# Patient Record
Sex: FEMALE | Race: OTHER | HISPANIC OR LATINO
[De-identification: names, ages, dates, MRNs, and addresses within clinical notes are randomized per-mention and may not be internally consistent; named-entity substitution may affect disease eponyms.]

---

## 2017-11-01 ENCOUNTER — APPOINTMENT (OUTPATIENT)
Dept: OTOLARYNGOLOGY | Facility: CLINIC | Age: 32
End: 2017-11-01
Payer: COMMERCIAL

## 2017-11-01 PROCEDURE — 99244 OFF/OP CNSLTJ NEW/EST MOD 40: CPT

## 2017-11-13 ENCOUNTER — OUTPATIENT (OUTPATIENT)
Dept: OUTPATIENT SERVICES | Facility: HOSPITAL | Age: 32
LOS: 1 days | Discharge: HOME | End: 2017-11-13

## 2017-11-13 DIAGNOSIS — Z01.818 ENCOUNTER FOR OTHER PREPROCEDURAL EXAMINATION: ICD-10-CM

## 2017-12-05 ENCOUNTER — APPOINTMENT (OUTPATIENT)
Dept: OTOLARYNGOLOGY | Facility: HOSPITAL | Age: 32
End: 2017-12-05

## 2022-11-30 PROBLEM — Z00.00 ENCOUNTER FOR PREVENTIVE HEALTH EXAMINATION: Status: ACTIVE | Noted: 2022-11-30

## 2022-12-02 ENCOUNTER — APPOINTMENT (OUTPATIENT)
Dept: PLASTIC SURGERY | Facility: CLINIC | Age: 37
End: 2022-12-02

## 2022-12-02 VITALS — BODY MASS INDEX: 36.14 KG/M2 | HEIGHT: 63 IN | WEIGHT: 204 LBS

## 2022-12-02 DIAGNOSIS — Z78.9 OTHER SPECIFIED HEALTH STATUS: ICD-10-CM

## 2022-12-02 DIAGNOSIS — Z87.09 PERSONAL HISTORY OF OTHER DISEASES OF THE RESPIRATORY SYSTEM: ICD-10-CM

## 2022-12-02 DIAGNOSIS — Z86.39 PERSONAL HISTORY OF OTHER ENDOCRINE, NUTRITIONAL AND METABOLIC DISEASE: ICD-10-CM

## 2022-12-02 DIAGNOSIS — Z80.3 FAMILY HISTORY OF MALIGNANT NEOPLASM OF BREAST: ICD-10-CM

## 2022-12-02 PROCEDURE — 99203 OFFICE O/P NEW LOW 30 MIN: CPT

## 2022-12-02 RX ORDER — FAMOTIDINE 20 MG/1
20 TABLET, FILM COATED ORAL
Qty: 30 | Refills: 0 | Status: ACTIVE | COMMUNITY
Start: 2022-06-22

## 2022-12-02 RX ORDER — MULTIVITAMIN
TABLET ORAL
Refills: 0 | Status: ACTIVE | COMMUNITY

## 2022-12-02 RX ORDER — NAPROXEN 500 MG/1
500 TABLET ORAL
Qty: 28 | Refills: 0 | Status: ACTIVE | COMMUNITY
Start: 2022-06-28

## 2022-12-02 RX ORDER — ALBUTEROL SULFATE 0.63 MG/3ML
0.63 SOLUTION RESPIRATORY (INHALATION)
Refills: 0 | Status: ACTIVE | COMMUNITY

## 2022-12-02 RX ORDER — TIZANIDINE 4 MG/1
4 TABLET ORAL
Qty: 14 | Refills: 0 | Status: ACTIVE | COMMUNITY
Start: 2022-06-28

## 2022-12-02 RX ORDER — FLUTICASONE PROPIONATE AND SALMETEROL 50; 250 UG/1; UG/1
250-50 POWDER RESPIRATORY (INHALATION)
Qty: 60 | Refills: 0 | Status: ACTIVE | COMMUNITY
Start: 2022-08-03

## 2023-04-11 ENCOUNTER — NON-APPOINTMENT (OUTPATIENT)
Age: 38
End: 2023-04-11

## 2023-04-14 ENCOUNTER — NON-APPOINTMENT (OUTPATIENT)
Age: 38
End: 2023-04-14

## 2023-04-23 ENCOUNTER — LABORATORY RESULT (OUTPATIENT)
Age: 38
End: 2023-04-23

## 2023-04-24 ENCOUNTER — APPOINTMENT (OUTPATIENT)
Dept: OBGYN | Facility: CLINIC | Age: 38
End: 2023-04-24
Payer: COMMERCIAL

## 2023-04-24 VITALS — BODY MASS INDEX: 36.32 KG/M2 | HEIGHT: 63 IN | TEMPERATURE: 96.7 F | WEIGHT: 205 LBS

## 2023-04-24 DIAGNOSIS — E04.9 NONTOXIC GOITER, UNSPECIFIED: ICD-10-CM

## 2023-04-24 LAB
BILIRUB UR QL STRIP: NORMAL
GLUCOSE UR-MCNC: NORMAL
HCG UR QL: 0.2 EU/DL
HGB UR QL STRIP.AUTO: NORMAL
KETONES UR-MCNC: NORMAL
LEUKOCYTE ESTERASE UR QL STRIP: NORMAL
NITRITE UR QL STRIP: NORMAL
PH UR STRIP: 5
PROT UR STRIP-MCNC: NORMAL
SP GR UR STRIP: 1.02

## 2023-04-24 PROCEDURE — 76830 TRANSVAGINAL US NON-OB: CPT

## 2023-04-24 PROCEDURE — 99385 PREV VISIT NEW AGE 18-39: CPT

## 2023-05-03 PROBLEM — E04.9 GOITER: Status: ACTIVE | Noted: 2023-05-03

## 2023-05-15 ENCOUNTER — APPOINTMENT (OUTPATIENT)
Dept: OBGYN | Facility: CLINIC | Age: 38
End: 2023-05-15
Payer: COMMERCIAL

## 2023-05-15 VITALS — BODY MASS INDEX: 36.68 KG/M2 | HEIGHT: 63 IN | WEIGHT: 207 LBS | TEMPERATURE: 97.6 F

## 2023-05-15 PROCEDURE — 99212 OFFICE O/P EST SF 10 MIN: CPT

## 2023-05-15 PROCEDURE — 76830 TRANSVAGINAL US NON-OB: CPT

## 2023-06-01 ENCOUNTER — LABORATORY RESULT (OUTPATIENT)
Age: 38
End: 2023-06-01

## 2023-06-01 ENCOUNTER — APPOINTMENT (OUTPATIENT)
Dept: OBGYN | Facility: CLINIC | Age: 38
End: 2023-06-01
Payer: COMMERCIAL

## 2023-06-01 VITALS — TEMPERATURE: 97.2 F | BODY MASS INDEX: 37.56 KG/M2 | HEIGHT: 63 IN | WEIGHT: 212 LBS

## 2023-06-01 PROCEDURE — 76813 OB US NUCHAL MEAS 1 GEST: CPT

## 2023-06-01 PROCEDURE — 0500F INITIAL PRENATAL CARE VISIT: CPT

## 2023-06-02 ENCOUNTER — APPOINTMENT (OUTPATIENT)
Dept: PLASTIC SURGERY | Facility: CLINIC | Age: 38
End: 2023-06-02

## 2023-06-09 ENCOUNTER — NON-APPOINTMENT (OUTPATIENT)
Age: 38
End: 2023-06-09

## 2023-06-17 DIAGNOSIS — Z01.419 ENCOUNTER FOR GYNECOLOGICAL EXAMINATION (GENERAL) (ROUTINE) W/OUT ABNORMAL FINDINGS: ICD-10-CM

## 2023-06-17 DIAGNOSIS — N91.1 SECONDARY AMENORRHEA: ICD-10-CM

## 2023-06-17 DIAGNOSIS — N62 HYPERTROPHY OF BREAST: ICD-10-CM

## 2023-06-21 ENCOUNTER — APPOINTMENT (OUTPATIENT)
Dept: OBGYN | Facility: CLINIC | Age: 38
End: 2023-06-21
Payer: COMMERCIAL

## 2023-06-21 VITALS — WEIGHT: 209 LBS | TEMPERATURE: 98.2 F | BODY MASS INDEX: 37.03 KG/M2 | HEIGHT: 63 IN

## 2023-06-21 PROCEDURE — 0502F SUBSEQUENT PRENATAL CARE: CPT

## 2023-06-22 LAB
BILIRUB UR QL STRIP: NORMAL
GLUCOSE UR-MCNC: NORMAL
HCG UR QL: 0.2 EU/DL
HGB UR QL STRIP.AUTO: NORMAL
KETONES UR-MCNC: NORMAL
LEUKOCYTE ESTERASE UR QL STRIP: NORMAL
NITRITE UR QL STRIP: NORMAL
PH UR STRIP: 6
PROT UR STRIP-MCNC: NORMAL
SP GR UR STRIP: 1.03

## 2023-07-11 ENCOUNTER — NON-APPOINTMENT (OUTPATIENT)
Age: 38
End: 2023-07-11

## 2023-07-12 ENCOUNTER — APPOINTMENT (OUTPATIENT)
Dept: OBGYN | Facility: CLINIC | Age: 38
End: 2023-07-12
Payer: COMMERCIAL

## 2023-07-12 VITALS — WEIGHT: 208 LBS | BODY MASS INDEX: 36.86 KG/M2 | HEIGHT: 63 IN

## 2023-07-12 LAB
BILIRUB UR QL STRIP: NEGATIVE
CLARITY UR: CLEAR
GLUCOSE UR-MCNC: NEGATIVE
HCG UR QL: 0.2 EU/DL
HGB UR QL STRIP.AUTO: NEGATIVE
KETONES UR-MCNC: NORMAL
LEUKOCYTE ESTERASE UR QL STRIP: NEGATIVE
NITRITE UR QL STRIP: NEGATIVE
PH UR STRIP: 5.5
PROT UR STRIP-MCNC: NEGATIVE
SP GR UR STRIP: 1.03

## 2023-07-12 PROCEDURE — 0502F SUBSEQUENT PRENATAL CARE: CPT

## 2023-07-19 ENCOUNTER — LABORATORY RESULT (OUTPATIENT)
Age: 38
End: 2023-07-19

## 2023-07-27 ENCOUNTER — NON-APPOINTMENT (OUTPATIENT)
Age: 38
End: 2023-07-27

## 2023-07-28 ENCOUNTER — NON-APPOINTMENT (OUTPATIENT)
Age: 38
End: 2023-07-28

## 2023-08-02 ENCOUNTER — APPOINTMENT (OUTPATIENT)
Dept: OBGYN | Facility: CLINIC | Age: 38
End: 2023-08-02
Payer: COMMERCIAL

## 2023-08-02 VITALS — WEIGHT: 214 LBS | HEIGHT: 62 IN | BODY MASS INDEX: 39.38 KG/M2

## 2023-08-02 PROCEDURE — 0502F SUBSEQUENT PRENATAL CARE: CPT

## 2023-08-02 PROCEDURE — 76805 OB US >/= 14 WKS SNGL FETUS: CPT

## 2023-08-09 ENCOUNTER — NON-APPOINTMENT (OUTPATIENT)
Age: 38
End: 2023-08-09

## 2023-08-14 ENCOUNTER — LABORATORY RESULT (OUTPATIENT)
Age: 38
End: 2023-08-14

## 2023-08-14 ENCOUNTER — NON-APPOINTMENT (OUTPATIENT)
Age: 38
End: 2023-08-14

## 2023-08-18 ENCOUNTER — NON-APPOINTMENT (OUTPATIENT)
Age: 38
End: 2023-08-18

## 2023-08-21 ENCOUNTER — NON-APPOINTMENT (OUTPATIENT)
Age: 38
End: 2023-08-21

## 2023-08-23 ENCOUNTER — TRANSCRIPTION ENCOUNTER (OUTPATIENT)
Age: 38
End: 2023-08-23

## 2023-08-23 ENCOUNTER — APPOINTMENT (OUTPATIENT)
Dept: OBGYN | Facility: CLINIC | Age: 38
End: 2023-08-23
Payer: COMMERCIAL

## 2023-08-23 VITALS — HEIGHT: 62 IN | BODY MASS INDEX: 39.38 KG/M2 | WEIGHT: 214 LBS | TEMPERATURE: 98.2 F

## 2023-08-23 LAB
BILIRUB UR QL STRIP: NORMAL
GLUCOSE UR-MCNC: NORMAL
HCG UR QL: 0.2 EU/DL
HGB UR QL STRIP.AUTO: NORMAL
KETONES UR-MCNC: NORMAL
LEUKOCYTE ESTERASE UR QL STRIP: NORMAL
NITRITE UR QL STRIP: NORMAL
PH UR STRIP: 6
PROT UR STRIP-MCNC: NORMAL
SP GR UR STRIP: 1.02

## 2023-08-23 PROCEDURE — 0502F SUBSEQUENT PRENATAL CARE: CPT

## 2023-09-11 ENCOUNTER — NON-APPOINTMENT (OUTPATIENT)
Age: 38
End: 2023-09-11

## 2023-09-14 ENCOUNTER — APPOINTMENT (OUTPATIENT)
Dept: OBGYN | Facility: CLINIC | Age: 38
End: 2023-09-14
Payer: COMMERCIAL

## 2023-09-14 VITALS — TEMPERATURE: 98.2 F | WEIGHT: 216 LBS | HEIGHT: 62 IN | BODY MASS INDEX: 39.75 KG/M2

## 2023-09-14 DIAGNOSIS — Z00.00 ENCOUNTER FOR GENERAL ADULT MEDICAL EXAMINATION W/OUT ABNORMAL FINDINGS: ICD-10-CM

## 2023-09-14 DIAGNOSIS — Z23 ENCOUNTER FOR IMMUNIZATION: ICD-10-CM

## 2023-09-14 LAB
BILIRUB UR QL STRIP: NORMAL
GLUCOSE UR-MCNC: NORMAL
HCG UR QL: 0.2 EU/DL
HGB UR QL STRIP.AUTO: NORMAL
KETONES UR-MCNC: 15
LEUKOCYTE ESTERASE UR QL STRIP: NORMAL
NITRITE UR QL STRIP: NORMAL
PH UR STRIP: 6
PROT UR STRIP-MCNC: NORMAL
SP GR UR STRIP: 1.02

## 2023-09-14 PROCEDURE — 90715 TDAP VACCINE 7 YRS/> IM: CPT

## 2023-09-14 PROCEDURE — 90471 IMMUNIZATION ADMIN: CPT

## 2023-09-14 PROCEDURE — 0502F SUBSEQUENT PRENATAL CARE: CPT

## 2023-09-20 ENCOUNTER — NON-APPOINTMENT (OUTPATIENT)
Age: 38
End: 2023-09-20

## 2023-10-03 ENCOUNTER — APPOINTMENT (OUTPATIENT)
Dept: OBGYN | Facility: CLINIC | Age: 38
End: 2023-10-03
Payer: COMMERCIAL

## 2023-10-03 VITALS — HEIGHT: 63 IN | TEMPERATURE: 97.3 F | WEIGHT: 217 LBS | BODY MASS INDEX: 38.45 KG/M2

## 2023-10-03 PROCEDURE — 0502F SUBSEQUENT PRENATAL CARE: CPT

## 2023-10-09 ENCOUNTER — NON-APPOINTMENT (OUTPATIENT)
Age: 38
End: 2023-10-09

## 2023-10-09 LAB
ALBUMIN SERPL ELPH-MCNC: 3.3 G/DL
ALP BLD-CCNC: 116 U/L
ALT SERPL-CCNC: 13 U/L
ANION GAP SERPL CALC-SCNC: 9 MMOL/L
AST SERPL-CCNC: 13 U/L
BASOPHILS # BLD AUTO: 0.02 K/UL
BASOPHILS NFR BLD AUTO: 0.2 %
BILIRUB SERPL-MCNC: <0.2 MG/DL
BUN SERPL-MCNC: 7 MG/DL
CALCIUM SERPL-MCNC: 9 MG/DL
CHLORIDE SERPL-SCNC: 101 MMOL/L
CO2 SERPL-SCNC: 24 MMOL/L
CREAT SERPL-MCNC: 0.8 MG/DL
CREAT SPEC-SCNC: 264 MG/DL
CREAT/PROT UR: 0.1 RATIO
EGFR: 97 ML/MIN/1.73M2
EOSINOPHIL # BLD AUTO: 0.04 K/UL
EOSINOPHIL NFR BLD AUTO: 0.4 %
FERRITIN SERPL-MCNC: 28 NG/ML
GLUCOSE SERPL-MCNC: 98 MG/DL
HBV SURFACE AG SER QL: NONREACTIVE
HCT VFR BLD CALC: 38.5 %
HCV AB SER QL: NONREACTIVE
HCV S/CO RATIO: 0.08 S/CO
HGB BLD-MCNC: 12.4 G/DL
HIV1+2 AB SPEC QL IA.RAPID: NONREACTIVE
IMM GRANULOCYTES NFR BLD AUTO: 0.4 %
LDH SERPL-CCNC: 141
LYMPHOCYTES # BLD AUTO: 1.73 K/UL
LYMPHOCYTES NFR BLD AUTO: 17.7 %
MAN DIFF?: NORMAL
MCHC RBC-ENTMCNC: 32 PG
MCHC RBC-ENTMCNC: 32.2 G/DL
MCV RBC AUTO: 99.5 FL
MONOCYTES # BLD AUTO: 0.67 K/UL
MONOCYTES NFR BLD AUTO: 6.9 %
NEUTROPHILS # BLD AUTO: 7.28 K/UL
NEUTROPHILS NFR BLD AUTO: 74.4 %
PLATELET # BLD AUTO: 211 K/UL
POTASSIUM SERPL-SCNC: 4.4 MMOL/L
PROT SERPL-MCNC: 6.3 G/DL
PROT UR-MCNC: 26 MG/DLG/24H
RBC # BLD: 3.87 M/UL
RBC # FLD: 13.5 %
SODIUM SERPL-SCNC: 134 MMOL/L
WBC # FLD AUTO: 9.78 K/UL

## 2023-10-18 ENCOUNTER — APPOINTMENT (OUTPATIENT)
Dept: OBGYN | Facility: CLINIC | Age: 38
End: 2023-10-18
Payer: COMMERCIAL

## 2023-10-18 VITALS — HEIGHT: 63 IN | BODY MASS INDEX: 38.62 KG/M2 | WEIGHT: 218 LBS

## 2023-10-18 PROCEDURE — 76819 FETAL BIOPHYS PROFIL W/O NST: CPT

## 2023-10-18 PROCEDURE — 76820 UMBILICAL ARTERY ECHO: CPT

## 2023-10-18 PROCEDURE — 76815 OB US LIMITED FETUS(S): CPT

## 2023-10-18 PROCEDURE — 0502F SUBSEQUENT PRENATAL CARE: CPT

## 2023-11-02 ENCOUNTER — NON-APPOINTMENT (OUTPATIENT)
Age: 38
End: 2023-11-02

## 2023-11-02 ENCOUNTER — OUTPATIENT (OUTPATIENT)
Dept: INPATIENT UNIT | Facility: HOSPITAL | Age: 38
LOS: 1 days | Discharge: HOME | End: 2023-11-02
Payer: COMMERCIAL

## 2023-11-02 ENCOUNTER — APPOINTMENT (OUTPATIENT)
Dept: OBGYN | Facility: CLINIC | Age: 38
End: 2023-11-02
Payer: COMMERCIAL

## 2023-11-02 VITALS — DIASTOLIC BLOOD PRESSURE: 88 MMHG | HEART RATE: 93 BPM | SYSTOLIC BLOOD PRESSURE: 141 MMHG

## 2023-11-02 VITALS — SYSTOLIC BLOOD PRESSURE: 152 MMHG | DIASTOLIC BLOOD PRESSURE: 86 MMHG | HEART RATE: 93 BPM

## 2023-11-02 VITALS — BODY MASS INDEX: 39.69 KG/M2 | WEIGHT: 224 LBS | HEIGHT: 63 IN

## 2023-11-02 DIAGNOSIS — Z3A.00 WEEKS OF GESTATION OF PREGNANCY NOT SPECIFIED: ICD-10-CM

## 2023-11-02 DIAGNOSIS — O26.899 OTHER SPECIFIED PREGNANCY RELATED CONDITIONS, UNSPECIFIED TRIMESTER: ICD-10-CM

## 2023-11-02 LAB
ALBUMIN SERPL ELPH-MCNC: 3.4 G/DL — LOW (ref 3.5–5.2)
ALBUMIN SERPL ELPH-MCNC: 3.4 G/DL — LOW (ref 3.5–5.2)
ALP SERPL-CCNC: 146 U/L — HIGH (ref 30–115)
ALP SERPL-CCNC: 146 U/L — HIGH (ref 30–115)
ALT FLD-CCNC: 18 U/L — SIGNIFICANT CHANGE UP (ref 0–41)
ALT FLD-CCNC: 18 U/L — SIGNIFICANT CHANGE UP (ref 0–41)
ANION GAP SERPL CALC-SCNC: 14 MMOL/L — SIGNIFICANT CHANGE UP (ref 7–14)
ANION GAP SERPL CALC-SCNC: 14 MMOL/L — SIGNIFICANT CHANGE UP (ref 7–14)
APTT BLD: 26.7 SEC — LOW (ref 27–39.2)
APTT BLD: 26.7 SEC — LOW (ref 27–39.2)
AST SERPL-CCNC: 20 U/L — SIGNIFICANT CHANGE UP (ref 0–41)
AST SERPL-CCNC: 20 U/L — SIGNIFICANT CHANGE UP (ref 0–41)
BASOPHILS # BLD AUTO: 0.02 K/UL — SIGNIFICANT CHANGE UP (ref 0–0.2)
BASOPHILS # BLD AUTO: 0.02 K/UL — SIGNIFICANT CHANGE UP (ref 0–0.2)
BASOPHILS NFR BLD AUTO: 0.2 % — SIGNIFICANT CHANGE UP (ref 0–1)
BASOPHILS NFR BLD AUTO: 0.2 % — SIGNIFICANT CHANGE UP (ref 0–1)
BILIRUB SERPL-MCNC: <0.2 MG/DL — SIGNIFICANT CHANGE UP (ref 0.2–1.2)
BILIRUB SERPL-MCNC: <0.2 MG/DL — SIGNIFICANT CHANGE UP (ref 0.2–1.2)
BILIRUB UR QL STRIP: NORMAL
BLD GP AB SCN SERPL QL: SIGNIFICANT CHANGE UP
BLD GP AB SCN SERPL QL: SIGNIFICANT CHANGE UP
BUN SERPL-MCNC: 8 MG/DL — LOW (ref 10–20)
BUN SERPL-MCNC: 8 MG/DL — LOW (ref 10–20)
CALCIUM SERPL-MCNC: 9.9 MG/DL — SIGNIFICANT CHANGE UP (ref 8.4–10.5)
CALCIUM SERPL-MCNC: 9.9 MG/DL — SIGNIFICANT CHANGE UP (ref 8.4–10.5)
CHLORIDE SERPL-SCNC: 102 MMOL/L — SIGNIFICANT CHANGE UP (ref 98–110)
CHLORIDE SERPL-SCNC: 102 MMOL/L — SIGNIFICANT CHANGE UP (ref 98–110)
CO2 SERPL-SCNC: 19 MMOL/L — SIGNIFICANT CHANGE UP (ref 17–32)
CO2 SERPL-SCNC: 19 MMOL/L — SIGNIFICANT CHANGE UP (ref 17–32)
CREAT ?TM UR-MCNC: 101 MG/DL — SIGNIFICANT CHANGE UP
CREAT ?TM UR-MCNC: 101 MG/DL — SIGNIFICANT CHANGE UP
CREAT SERPL-MCNC: 0.6 MG/DL — LOW (ref 0.7–1.5)
CREAT SERPL-MCNC: 0.6 MG/DL — LOW (ref 0.7–1.5)
EGFR: 118 ML/MIN/1.73M2 — SIGNIFICANT CHANGE UP
EGFR: 118 ML/MIN/1.73M2 — SIGNIFICANT CHANGE UP
EOSINOPHIL # BLD AUTO: 0.02 K/UL — SIGNIFICANT CHANGE UP (ref 0–0.7)
EOSINOPHIL # BLD AUTO: 0.02 K/UL — SIGNIFICANT CHANGE UP (ref 0–0.7)
EOSINOPHIL NFR BLD AUTO: 0.2 % — SIGNIFICANT CHANGE UP (ref 0–8)
EOSINOPHIL NFR BLD AUTO: 0.2 % — SIGNIFICANT CHANGE UP (ref 0–8)
FIBRINOGEN PPP-MCNC: >700 MG/DL — HIGH (ref 204.4–570.6)
FIBRINOGEN PPP-MCNC: >700 MG/DL — HIGH (ref 204.4–570.6)
GLUCOSE SERPL-MCNC: 83 MG/DL — SIGNIFICANT CHANGE UP (ref 70–99)
GLUCOSE SERPL-MCNC: 83 MG/DL — SIGNIFICANT CHANGE UP (ref 70–99)
GLUCOSE UR-MCNC: NORMAL
HCG UR QL: 0.2 EU/DL
HCT VFR BLD CALC: 39.5 % — SIGNIFICANT CHANGE UP (ref 37–47)
HCT VFR BLD CALC: 39.5 % — SIGNIFICANT CHANGE UP (ref 37–47)
HGB BLD-MCNC: 13.5 G/DL — SIGNIFICANT CHANGE UP (ref 12–16)
HGB BLD-MCNC: 13.5 G/DL — SIGNIFICANT CHANGE UP (ref 12–16)
HGB UR QL STRIP.AUTO: NORMAL
IMM GRANULOCYTES NFR BLD AUTO: 0.3 % — SIGNIFICANT CHANGE UP (ref 0.1–0.3)
IMM GRANULOCYTES NFR BLD AUTO: 0.3 % — SIGNIFICANT CHANGE UP (ref 0.1–0.3)
INR BLD: 0.89 RATIO — SIGNIFICANT CHANGE UP (ref 0.65–1.3)
INR BLD: 0.89 RATIO — SIGNIFICANT CHANGE UP (ref 0.65–1.3)
KETONES UR-MCNC: NORMAL
LDH SERPL L TO P-CCNC: 162 — SIGNIFICANT CHANGE UP (ref 50–242)
LDH SERPL L TO P-CCNC: 162 — SIGNIFICANT CHANGE UP (ref 50–242)
LEUKOCYTE ESTERASE UR QL STRIP: NORMAL
LYMPHOCYTES # BLD AUTO: 1.83 K/UL — SIGNIFICANT CHANGE UP (ref 1.2–3.4)
LYMPHOCYTES # BLD AUTO: 1.83 K/UL — SIGNIFICANT CHANGE UP (ref 1.2–3.4)
LYMPHOCYTES # BLD AUTO: 18.5 % — LOW (ref 20.5–51.1)
LYMPHOCYTES # BLD AUTO: 18.5 % — LOW (ref 20.5–51.1)
MCHC RBC-ENTMCNC: 31.8 PG — HIGH (ref 27–31)
MCHC RBC-ENTMCNC: 31.8 PG — HIGH (ref 27–31)
MCHC RBC-ENTMCNC: 34.2 G/DL — SIGNIFICANT CHANGE UP (ref 32–37)
MCHC RBC-ENTMCNC: 34.2 G/DL — SIGNIFICANT CHANGE UP (ref 32–37)
MCV RBC AUTO: 93.2 FL — SIGNIFICANT CHANGE UP (ref 81–99)
MCV RBC AUTO: 93.2 FL — SIGNIFICANT CHANGE UP (ref 81–99)
MONOCYTES # BLD AUTO: 0.62 K/UL — HIGH (ref 0.1–0.6)
MONOCYTES # BLD AUTO: 0.62 K/UL — HIGH (ref 0.1–0.6)
MONOCYTES NFR BLD AUTO: 6.3 % — SIGNIFICANT CHANGE UP (ref 1.7–9.3)
MONOCYTES NFR BLD AUTO: 6.3 % — SIGNIFICANT CHANGE UP (ref 1.7–9.3)
NEUTROPHILS # BLD AUTO: 7.39 K/UL — HIGH (ref 1.4–6.5)
NEUTROPHILS # BLD AUTO: 7.39 K/UL — HIGH (ref 1.4–6.5)
NEUTROPHILS NFR BLD AUTO: 74.5 % — SIGNIFICANT CHANGE UP (ref 42.2–75.2)
NEUTROPHILS NFR BLD AUTO: 74.5 % — SIGNIFICANT CHANGE UP (ref 42.2–75.2)
NITRITE UR QL STRIP: NORMAL
NRBC # BLD: 0 /100 WBCS — SIGNIFICANT CHANGE UP (ref 0–0)
NRBC # BLD: 0 /100 WBCS — SIGNIFICANT CHANGE UP (ref 0–0)
PH UR STRIP: 5.5
PLATELET # BLD AUTO: 213 K/UL — SIGNIFICANT CHANGE UP (ref 130–400)
PLATELET # BLD AUTO: 213 K/UL — SIGNIFICANT CHANGE UP (ref 130–400)
PMV BLD: 11.8 FL — HIGH (ref 7.4–10.4)
PMV BLD: 11.8 FL — HIGH (ref 7.4–10.4)
POTASSIUM SERPL-MCNC: 4.6 MMOL/L — SIGNIFICANT CHANGE UP (ref 3.5–5)
POTASSIUM SERPL-MCNC: 4.6 MMOL/L — SIGNIFICANT CHANGE UP (ref 3.5–5)
POTASSIUM SERPL-SCNC: 4.6 MMOL/L — SIGNIFICANT CHANGE UP (ref 3.5–5)
POTASSIUM SERPL-SCNC: 4.6 MMOL/L — SIGNIFICANT CHANGE UP (ref 3.5–5)
PROT ?TM UR-MCNC: 17 MG/DLG/24H — SIGNIFICANT CHANGE UP
PROT ?TM UR-MCNC: 17 MG/DLG/24H — SIGNIFICANT CHANGE UP
PROT SERPL-MCNC: 6.5 G/DL — SIGNIFICANT CHANGE UP (ref 6–8)
PROT SERPL-MCNC: 6.5 G/DL — SIGNIFICANT CHANGE UP (ref 6–8)
PROT UR STRIP-MCNC: 30
PROT/CREAT UR-RTO: 0.2 RATIO — SIGNIFICANT CHANGE UP (ref 0–0.2)
PROT/CREAT UR-RTO: 0.2 RATIO — SIGNIFICANT CHANGE UP (ref 0–0.2)
PROTHROM AB SERPL-ACNC: 10.1 SEC — SIGNIFICANT CHANGE UP (ref 9.95–12.87)
PROTHROM AB SERPL-ACNC: 10.1 SEC — SIGNIFICANT CHANGE UP (ref 9.95–12.87)
RBC # BLD: 4.24 M/UL — SIGNIFICANT CHANGE UP (ref 4.2–5.4)
RBC # BLD: 4.24 M/UL — SIGNIFICANT CHANGE UP (ref 4.2–5.4)
RBC # FLD: 12.9 % — SIGNIFICANT CHANGE UP (ref 11.5–14.5)
RBC # FLD: 12.9 % — SIGNIFICANT CHANGE UP (ref 11.5–14.5)
SODIUM SERPL-SCNC: 135 MMOL/L — SIGNIFICANT CHANGE UP (ref 135–146)
SODIUM SERPL-SCNC: 135 MMOL/L — SIGNIFICANT CHANGE UP (ref 135–146)
SP GR UR STRIP: 1.03
URATE SERPL-MCNC: 3.9 MG/DL — SIGNIFICANT CHANGE UP (ref 2.5–7)
URATE SERPL-MCNC: 3.9 MG/DL — SIGNIFICANT CHANGE UP (ref 2.5–7)
WBC # BLD: 9.91 K/UL — SIGNIFICANT CHANGE UP (ref 4.8–10.8)
WBC # BLD: 9.91 K/UL — SIGNIFICANT CHANGE UP (ref 4.8–10.8)
WBC # FLD AUTO: 9.91 K/UL — SIGNIFICANT CHANGE UP (ref 4.8–10.8)
WBC # FLD AUTO: 9.91 K/UL — SIGNIFICANT CHANGE UP (ref 4.8–10.8)

## 2023-11-02 PROCEDURE — 86901 BLOOD TYPING SEROLOGIC RH(D): CPT

## 2023-11-02 PROCEDURE — 85730 THROMBOPLASTIN TIME PARTIAL: CPT

## 2023-11-02 PROCEDURE — 87653 STREP B DNA AMP PROBE: CPT

## 2023-11-02 PROCEDURE — 80053 COMPREHEN METABOLIC PANEL: CPT

## 2023-11-02 PROCEDURE — 36415 COLL VENOUS BLD VENIPUNCTURE: CPT

## 2023-11-02 PROCEDURE — 85610 PROTHROMBIN TIME: CPT

## 2023-11-02 PROCEDURE — 85384 FIBRINOGEN ACTIVITY: CPT

## 2023-11-02 PROCEDURE — 86900 BLOOD TYPING SEROLOGIC ABO: CPT

## 2023-11-02 PROCEDURE — 83615 LACTATE (LD) (LDH) ENZYME: CPT

## 2023-11-02 PROCEDURE — 84156 ASSAY OF PROTEIN URINE: CPT

## 2023-11-02 PROCEDURE — 0502F SUBSEQUENT PRENATAL CARE: CPT

## 2023-11-02 PROCEDURE — 86850 RBC ANTIBODY SCREEN: CPT

## 2023-11-02 PROCEDURE — 99214 OFFICE O/P EST MOD 30 MIN: CPT

## 2023-11-02 PROCEDURE — 99214 OFFICE O/P EST MOD 30 MIN: CPT | Mod: GC

## 2023-11-02 PROCEDURE — 84550 ASSAY OF BLOOD/URIC ACID: CPT

## 2023-11-02 PROCEDURE — 82570 ASSAY OF URINE CREATININE: CPT

## 2023-11-02 PROCEDURE — 59025 FETAL NON-STRESS TEST: CPT

## 2023-11-02 PROCEDURE — 85025 COMPLETE CBC W/AUTO DIFF WBC: CPT

## 2023-11-02 NOTE — OB RN TRIAGE NOTE - FALL HARM RISK - UNIVERSAL INTERVENTIONS
Bed in lowest position, wheels locked, appropriate side rails in place/Call bell, personal items and telephone in reach/Instruct patient to call for assistance before getting out of bed or chair/Non-slip footwear when patient is out of bed/[place] to call system/Physically safe environment - no spills, clutter or unnecessary equipment/Room/bathroom lighting operational, light cord in reach

## 2023-11-02 NOTE — OB RN TRIAGE NOTE - CURRENT PREGNANCY COMPLICATIONS, OB PROFILE
The Service to Non Interventional Pain Management order in workqueue [id number] requested on 3/23/2023 has been removed as, patient scheduling elsewhere.    Please contact patient, if further communication is needed.    Patient stated he is looking for a Non Interventional Pain Management closer to his home.   None

## 2023-11-02 NOTE — OB PROVIDER TRIAGE NOTE - NSOBPROVIDERNOTE_OBGYN_ALL_OB_FT
37y.o.  @ 34.5wks with elevated BP r/o gHTN vs preeclampsia, AMA, SMA carrier- FOB negative, h/o Asthma  - Observation in L&D  - Preeclampsia labs  - Serial BP's q 15 min  - Continuous efm and toco  Dr. Arthur/ Dr. Vyas aware 37y.o.  @ 34.5wks with elevated BP r/o gHTN vs preeclampsia, AMA, SMA carrier- FOB negative, h/o Asthma  - Observation in L&D  - Preeclampsia labs  - Serial BP's q 15 min  - Continuous efm and toco  Dr. Arthur/ Dr. Vyas aware    ADDENDUM: Patient monitored on L&D for 4 hours, met criteria for gHTN, PELs wnl, f/u Uprcr.  Patient informed to  BP cuff at pharmacy for at home monitoring.  Will continue with regular PNC.

## 2023-11-02 NOTE — OB PROVIDER TRIAGE NOTE - NSHPLABSRESULTS_GEN_ALL_CORE
Sono today vtx, BPP 8/8 MVP 3.5cm    Sono @ 31.4wks vtx, S=D, EFW: 2148g, 71%, BPP 8/8  Sono @ 22wks S=D, nl anatomy  Sono @ 12.6wks S=D, NT WNL  Sono @ 10.4wks EDC: 12/9/2023    SMA carrier positive for 2 copies  FOB SMA negative    10/9 AST/ALT 13/13   10/5 Urine protein creatinine ratio 0.1    6/1/2023  Type and screen O positive  RPR nonreactive  HBSag nonreactive  HIV nonreactive  Rubella immune  Rubeola immune  Varicella immune    AFP negative

## 2023-11-02 NOTE — OB PROVIDER TRIAGE NOTE - NS_OBACUITYLEVEL_OBGYN_ALL_OB
I have reviewed the patient's history and physical and examined the patient and find no relevant changes.
3

## 2023-11-02 NOTE — OB PROVIDER TRIAGE NOTE - HISTORY OF PRESENT ILLNESS
Pt is a 38y.o.  @ 34.5wks dates by 10wk parveen presents from PMD office for evaluation of elevated blood pressure. Pt was seen today for routine prenatal visit. Pt had c/o mild HA yesterday which resolved spontaneously and mild nausea. Blood pressure in office today was 190/110--> 200/120. Pts blood pressures have been mildly elevated from baseline 130/80's. Pt denies HA, visual changes, epigastric or RUQ pain. Pt has mild LE edema.     Pt had BPP today in office, , MVP 3.5    Pt has h/o Asthma on Albuterol PRN- has not used recently, usually exacerbated by URI, denies H/I  SMA carrier- FOB negative

## 2023-11-02 NOTE — OB PROVIDER TRIAGE NOTE - NSHPPHYSICALEXAM_GEN_ALL_CORE
T(C): --  HR: 101 (11-02-23 @ 18:04) (93 - 101)  BP: 138/82 (11-02-23 @ 18:04) (138/82 - 152/86)  RR: --  SpO2: --    Gen: NAD  Abd: gravid ,soft, NT, neg RUQ or epigastric tenderness  VE: deferred  Ctx: irritability  FHR: 140 moderate variability, Cat I- reactive NST  Ext: 2+ edema, 1+ Patellar DTR

## 2023-11-02 NOTE — OB PROVIDER TRIAGE NOTE - NSICDXFAMILYHX_GEN_ALL_CORE_FT
FAMILY HISTORY:  Mother  Still living? Unknown  Family hx of lung cancer, Age at diagnosis: Age Unknown  FHx: type 2 diabetes mellitus, Age at diagnosis: Age Unknown    Grandparent  Still living? Unknown  Family history of breast cancer, Age at diagnosis: Age Unknown

## 2023-11-02 NOTE — OB PROVIDER TRIAGE NOTE - ATTENDING COMMENTS
agree with above  no severe range BPs  diagnosed with gHTN r/o preelcampsia  discharge with close follow up   IOL 37 wks   discharge with precautions

## 2023-11-03 ENCOUNTER — NON-APPOINTMENT (OUTPATIENT)
Age: 38
End: 2023-11-03

## 2023-11-04 LAB
GROUP B BETA STREP DNA (PCR): SIGNIFICANT CHANGE UP
GROUP B BETA STREP DNA (PCR): SIGNIFICANT CHANGE UP
SOURCE GROUP B STREP: SIGNIFICANT CHANGE UP
SOURCE GROUP B STREP: SIGNIFICANT CHANGE UP

## 2023-11-06 ENCOUNTER — NON-APPOINTMENT (OUTPATIENT)
Age: 38
End: 2023-11-06

## 2023-11-06 DIAGNOSIS — Z3A.34 34 WEEKS GESTATION OF PREGNANCY: ICD-10-CM

## 2023-11-06 DIAGNOSIS — J45.909 UNSPECIFIED ASTHMA, UNCOMPLICATED: ICD-10-CM

## 2023-11-06 DIAGNOSIS — O09.513 SUPERVISION OF ELDERLY PRIMIGRAVIDA, THIRD TRIMESTER: ICD-10-CM

## 2023-11-06 DIAGNOSIS — O99.513 DISEASES OF THE RESPIRATORY SYSTEM COMPLICATING PREGNANCY, THIRD TRIMESTER: ICD-10-CM

## 2023-11-06 DIAGNOSIS — O13.3 GESTATIONAL [PREGNANCY-INDUCED] HYPERTENSION WITHOUT SIGNIFICANT PROTEINURIA, THIRD TRIMESTER: ICD-10-CM

## 2023-11-08 ENCOUNTER — APPOINTMENT (OUTPATIENT)
Dept: OBGYN | Facility: CLINIC | Age: 38
End: 2023-11-08
Payer: COMMERCIAL

## 2023-11-08 VITALS — WEIGHT: 223 LBS | HEIGHT: 63 IN | TEMPERATURE: 97.9 F | BODY MASS INDEX: 39.51 KG/M2

## 2023-11-08 PROCEDURE — 0502F SUBSEQUENT PRENATAL CARE: CPT

## 2023-11-08 PROCEDURE — 76819 FETAL BIOPHYS PROFIL W/O NST: CPT

## 2023-11-09 PROBLEM — J45.909 UNSPECIFIED ASTHMA, UNCOMPLICATED: Chronic | Status: ACTIVE | Noted: 2023-11-02

## 2023-11-11 ENCOUNTER — NON-APPOINTMENT (OUTPATIENT)
Age: 38
End: 2023-11-11

## 2023-11-14 ENCOUNTER — APPOINTMENT (OUTPATIENT)
Dept: OBGYN | Facility: CLINIC | Age: 38
End: 2023-11-14
Payer: COMMERCIAL

## 2023-11-14 VITALS — HEIGHT: 63 IN | WEIGHT: 226 LBS | BODY MASS INDEX: 40.04 KG/M2

## 2023-11-14 DIAGNOSIS — Z34.90 ENCOUNTER FOR SUPERVISION OF NORMAL PREGNANCY, UNSPECIFIED, UNSPECIFIED TRIMESTER: ICD-10-CM

## 2023-11-14 PROCEDURE — 0502F SUBSEQUENT PRENATAL CARE: CPT

## 2023-11-14 PROCEDURE — 76819 FETAL BIOPHYS PROFIL W/O NST: CPT

## 2023-11-17 ENCOUNTER — NON-APPOINTMENT (OUTPATIENT)
Age: 38
End: 2023-11-17

## 2023-11-20 ENCOUNTER — INPATIENT (INPATIENT)
Facility: HOSPITAL | Age: 38
LOS: 1 days | Discharge: HOME | End: 2023-11-22
Attending: STUDENT IN AN ORGANIZED HEALTH CARE EDUCATION/TRAINING PROGRAM | Admitting: STUDENT IN AN ORGANIZED HEALTH CARE EDUCATION/TRAINING PROGRAM
Payer: COMMERCIAL

## 2023-11-20 ENCOUNTER — RESULT REVIEW (OUTPATIENT)
Age: 38
End: 2023-11-20

## 2023-11-20 ENCOUNTER — TRANSCRIPTION ENCOUNTER (OUTPATIENT)
Age: 38
End: 2023-11-20

## 2023-11-20 VITALS — DIASTOLIC BLOOD PRESSURE: 96 MMHG | HEART RATE: 101 BPM | SYSTOLIC BLOOD PRESSURE: 141 MMHG

## 2023-11-20 LAB
ALBUMIN SERPL ELPH-MCNC: 3.4 G/DL — LOW (ref 3.5–5.2)
ALBUMIN SERPL ELPH-MCNC: 3.4 G/DL — LOW (ref 3.5–5.2)
ALP SERPL-CCNC: 153 U/L — HIGH (ref 30–115)
ALP SERPL-CCNC: 153 U/L — HIGH (ref 30–115)
ALT FLD-CCNC: 17 U/L — SIGNIFICANT CHANGE UP (ref 0–41)
ALT FLD-CCNC: 17 U/L — SIGNIFICANT CHANGE UP (ref 0–41)
ANION GAP SERPL CALC-SCNC: 11 MMOL/L — SIGNIFICANT CHANGE UP (ref 7–14)
ANION GAP SERPL CALC-SCNC: 11 MMOL/L — SIGNIFICANT CHANGE UP (ref 7–14)
APTT BLD: 27.3 SEC — SIGNIFICANT CHANGE UP (ref 27–39.2)
APTT BLD: 27.3 SEC — SIGNIFICANT CHANGE UP (ref 27–39.2)
AST SERPL-CCNC: 17 U/L — SIGNIFICANT CHANGE UP (ref 0–41)
AST SERPL-CCNC: 17 U/L — SIGNIFICANT CHANGE UP (ref 0–41)
BASOPHILS # BLD AUTO: 0.03 K/UL — SIGNIFICANT CHANGE UP (ref 0–0.2)
BASOPHILS # BLD AUTO: 0.03 K/UL — SIGNIFICANT CHANGE UP (ref 0–0.2)
BASOPHILS NFR BLD AUTO: 0.3 % — SIGNIFICANT CHANGE UP (ref 0–1)
BASOPHILS NFR BLD AUTO: 0.3 % — SIGNIFICANT CHANGE UP (ref 0–1)
BILIRUB SERPL-MCNC: 0.2 MG/DL — SIGNIFICANT CHANGE UP (ref 0.2–1.2)
BILIRUB SERPL-MCNC: 0.2 MG/DL — SIGNIFICANT CHANGE UP (ref 0.2–1.2)
BLD GP AB SCN SERPL QL: SIGNIFICANT CHANGE UP
BLD GP AB SCN SERPL QL: SIGNIFICANT CHANGE UP
BUN SERPL-MCNC: 8 MG/DL — LOW (ref 10–20)
BUN SERPL-MCNC: 8 MG/DL — LOW (ref 10–20)
CALCIUM SERPL-MCNC: 8.9 MG/DL — SIGNIFICANT CHANGE UP (ref 8.4–10.4)
CALCIUM SERPL-MCNC: 8.9 MG/DL — SIGNIFICANT CHANGE UP (ref 8.4–10.4)
CHLORIDE SERPL-SCNC: 105 MMOL/L — SIGNIFICANT CHANGE UP (ref 98–110)
CHLORIDE SERPL-SCNC: 105 MMOL/L — SIGNIFICANT CHANGE UP (ref 98–110)
CO2 SERPL-SCNC: 17 MMOL/L — SIGNIFICANT CHANGE UP (ref 17–32)
CO2 SERPL-SCNC: 17 MMOL/L — SIGNIFICANT CHANGE UP (ref 17–32)
CREAT SERPL-MCNC: 0.7 MG/DL — SIGNIFICANT CHANGE UP (ref 0.7–1.5)
CREAT SERPL-MCNC: 0.7 MG/DL — SIGNIFICANT CHANGE UP (ref 0.7–1.5)
EGFR: 114 ML/MIN/1.73M2 — SIGNIFICANT CHANGE UP
EGFR: 114 ML/MIN/1.73M2 — SIGNIFICANT CHANGE UP
EOSINOPHIL # BLD AUTO: 0.03 K/UL — SIGNIFICANT CHANGE UP (ref 0–0.7)
EOSINOPHIL # BLD AUTO: 0.03 K/UL — SIGNIFICANT CHANGE UP (ref 0–0.7)
EOSINOPHIL NFR BLD AUTO: 0.3 % — SIGNIFICANT CHANGE UP (ref 0–8)
EOSINOPHIL NFR BLD AUTO: 0.3 % — SIGNIFICANT CHANGE UP (ref 0–8)
FIBRINOGEN PPP-MCNC: 650 MG/DL — HIGH (ref 204.4–570.6)
FIBRINOGEN PPP-MCNC: 650 MG/DL — HIGH (ref 204.4–570.6)
GLUCOSE SERPL-MCNC: 105 MG/DL — HIGH (ref 70–99)
GLUCOSE SERPL-MCNC: 105 MG/DL — HIGH (ref 70–99)
HCT VFR BLD CALC: 39 % — SIGNIFICANT CHANGE UP (ref 37–47)
HCT VFR BLD CALC: 39 % — SIGNIFICANT CHANGE UP (ref 37–47)
HGB BLD-MCNC: 13.3 G/DL — SIGNIFICANT CHANGE UP (ref 12–16)
HGB BLD-MCNC: 13.3 G/DL — SIGNIFICANT CHANGE UP (ref 12–16)
IMM GRANULOCYTES NFR BLD AUTO: 0.4 % — HIGH (ref 0.1–0.3)
IMM GRANULOCYTES NFR BLD AUTO: 0.4 % — HIGH (ref 0.1–0.3)
INR BLD: 0.88 RATIO — SIGNIFICANT CHANGE UP (ref 0.65–1.3)
INR BLD: 0.88 RATIO — SIGNIFICANT CHANGE UP (ref 0.65–1.3)
LDH SERPL L TO P-CCNC: 160 — SIGNIFICANT CHANGE UP (ref 50–242)
LDH SERPL L TO P-CCNC: 160 — SIGNIFICANT CHANGE UP (ref 50–242)
LYMPHOCYTES # BLD AUTO: 2.13 K/UL — SIGNIFICANT CHANGE UP (ref 1.2–3.4)
LYMPHOCYTES # BLD AUTO: 2.13 K/UL — SIGNIFICANT CHANGE UP (ref 1.2–3.4)
LYMPHOCYTES # BLD AUTO: 20.8 % — SIGNIFICANT CHANGE UP (ref 20.5–51.1)
LYMPHOCYTES # BLD AUTO: 20.8 % — SIGNIFICANT CHANGE UP (ref 20.5–51.1)
MCHC RBC-ENTMCNC: 32.1 PG — HIGH (ref 27–31)
MCHC RBC-ENTMCNC: 32.1 PG — HIGH (ref 27–31)
MCHC RBC-ENTMCNC: 34.1 G/DL — SIGNIFICANT CHANGE UP (ref 32–37)
MCHC RBC-ENTMCNC: 34.1 G/DL — SIGNIFICANT CHANGE UP (ref 32–37)
MCV RBC AUTO: 94.2 FL — SIGNIFICANT CHANGE UP (ref 81–99)
MCV RBC AUTO: 94.2 FL — SIGNIFICANT CHANGE UP (ref 81–99)
MONOCYTES # BLD AUTO: 0.68 K/UL — HIGH (ref 0.1–0.6)
MONOCYTES # BLD AUTO: 0.68 K/UL — HIGH (ref 0.1–0.6)
MONOCYTES NFR BLD AUTO: 6.6 % — SIGNIFICANT CHANGE UP (ref 1.7–9.3)
MONOCYTES NFR BLD AUTO: 6.6 % — SIGNIFICANT CHANGE UP (ref 1.7–9.3)
NEUTROPHILS # BLD AUTO: 7.34 K/UL — HIGH (ref 1.4–6.5)
NEUTROPHILS # BLD AUTO: 7.34 K/UL — HIGH (ref 1.4–6.5)
NEUTROPHILS NFR BLD AUTO: 71.6 % — SIGNIFICANT CHANGE UP (ref 42.2–75.2)
NEUTROPHILS NFR BLD AUTO: 71.6 % — SIGNIFICANT CHANGE UP (ref 42.2–75.2)
NRBC # BLD: 0 /100 WBCS — SIGNIFICANT CHANGE UP (ref 0–0)
NRBC # BLD: 0 /100 WBCS — SIGNIFICANT CHANGE UP (ref 0–0)
PLATELET # BLD AUTO: 177 K/UL — SIGNIFICANT CHANGE UP (ref 130–400)
PLATELET # BLD AUTO: 177 K/UL — SIGNIFICANT CHANGE UP (ref 130–400)
PMV BLD: 12.7 FL — HIGH (ref 7.4–10.4)
PMV BLD: 12.7 FL — HIGH (ref 7.4–10.4)
POTASSIUM SERPL-MCNC: 4.2 MMOL/L — SIGNIFICANT CHANGE UP (ref 3.5–5)
POTASSIUM SERPL-MCNC: 4.2 MMOL/L — SIGNIFICANT CHANGE UP (ref 3.5–5)
POTASSIUM SERPL-SCNC: 4.2 MMOL/L — SIGNIFICANT CHANGE UP (ref 3.5–5)
POTASSIUM SERPL-SCNC: 4.2 MMOL/L — SIGNIFICANT CHANGE UP (ref 3.5–5)
PRENATAL SYPHILIS TEST: SIGNIFICANT CHANGE UP
PRENATAL SYPHILIS TEST: SIGNIFICANT CHANGE UP
PROT SERPL-MCNC: 6.1 G/DL — SIGNIFICANT CHANGE UP (ref 6–8)
PROT SERPL-MCNC: 6.1 G/DL — SIGNIFICANT CHANGE UP (ref 6–8)
PROTHROM AB SERPL-ACNC: 10 SEC — SIGNIFICANT CHANGE UP (ref 9.95–12.87)
PROTHROM AB SERPL-ACNC: 10 SEC — SIGNIFICANT CHANGE UP (ref 9.95–12.87)
RBC # BLD: 4.14 M/UL — LOW (ref 4.2–5.4)
RBC # BLD: 4.14 M/UL — LOW (ref 4.2–5.4)
RBC # FLD: 13 % — SIGNIFICANT CHANGE UP (ref 11.5–14.5)
RBC # FLD: 13 % — SIGNIFICANT CHANGE UP (ref 11.5–14.5)
SODIUM SERPL-SCNC: 133 MMOL/L — LOW (ref 135–146)
SODIUM SERPL-SCNC: 133 MMOL/L — LOW (ref 135–146)
URATE SERPL-MCNC: 4.8 MG/DL — SIGNIFICANT CHANGE UP (ref 2.5–7)
URATE SERPL-MCNC: 4.8 MG/DL — SIGNIFICANT CHANGE UP (ref 2.5–7)
WBC # BLD: 10.25 K/UL — SIGNIFICANT CHANGE UP (ref 4.8–10.8)
WBC # BLD: 10.25 K/UL — SIGNIFICANT CHANGE UP (ref 4.8–10.8)
WBC # FLD AUTO: 10.25 K/UL — SIGNIFICANT CHANGE UP (ref 4.8–10.8)
WBC # FLD AUTO: 10.25 K/UL — SIGNIFICANT CHANGE UP (ref 4.8–10.8)

## 2023-11-20 PROCEDURE — 86901 BLOOD TYPING SEROLOGIC RH(D): CPT

## 2023-11-20 PROCEDURE — 80053 COMPREHEN METABOLIC PANEL: CPT

## 2023-11-20 PROCEDURE — 85025 COMPLETE CBC W/AUTO DIFF WBC: CPT

## 2023-11-20 PROCEDURE — 88307 TISSUE EXAM BY PATHOLOGIST: CPT

## 2023-11-20 PROCEDURE — 83615 LACTATE (LD) (LDH) ENZYME: CPT

## 2023-11-20 PROCEDURE — 85610 PROTHROMBIN TIME: CPT

## 2023-11-20 PROCEDURE — 86592 SYPHILIS TEST NON-TREP QUAL: CPT

## 2023-11-20 PROCEDURE — 85384 FIBRINOGEN ACTIVITY: CPT

## 2023-11-20 PROCEDURE — 86850 RBC ANTIBODY SCREEN: CPT

## 2023-11-20 PROCEDURE — 86900 BLOOD TYPING SEROLOGIC ABO: CPT

## 2023-11-20 PROCEDURE — 85730 THROMBOPLASTIN TIME PARTIAL: CPT

## 2023-11-20 PROCEDURE — 59510 CESAREAN DELIVERY: CPT

## 2023-11-20 PROCEDURE — 36415 COLL VENOUS BLD VENIPUNCTURE: CPT

## 2023-11-20 PROCEDURE — 84550 ASSAY OF BLOOD/URIC ACID: CPT

## 2023-11-20 PROCEDURE — 88307 TISSUE EXAM BY PATHOLOGIST: CPT | Mod: 26

## 2023-11-20 PROCEDURE — 59025 FETAL NON-STRESS TEST: CPT

## 2023-11-20 RX ORDER — NALOXONE HYDROCHLORIDE 4 MG/.1ML
0.1 SPRAY NASAL
Refills: 0 | Status: DISCONTINUED | OUTPATIENT
Start: 2023-11-20 | End: 2023-11-22

## 2023-11-20 RX ORDER — OXYTOCIN 10 UNIT/ML
333.33 VIAL (ML) INJECTION
Qty: 20 | Refills: 0 | Status: DISCONTINUED | OUTPATIENT
Start: 2023-11-20 | End: 2023-11-22

## 2023-11-20 RX ORDER — SODIUM CHLORIDE 9 MG/ML
1000 INJECTION, SOLUTION INTRAVENOUS ONCE
Refills: 0 | Status: DISCONTINUED | OUTPATIENT
Start: 2023-11-20 | End: 2023-11-20

## 2023-11-20 RX ORDER — BUDESONIDE AND FORMOTEROL FUMARATE DIHYDRATE 160; 4.5 UG/1; UG/1
2 AEROSOL RESPIRATORY (INHALATION)
Refills: 0 | Status: DISCONTINUED | OUTPATIENT
Start: 2023-11-20 | End: 2023-11-21

## 2023-11-20 RX ORDER — ENOXAPARIN SODIUM 100 MG/ML
40 INJECTION SUBCUTANEOUS EVERY 24 HOURS
Refills: 0 | Status: DISCONTINUED | OUTPATIENT
Start: 2023-11-20 | End: 2023-11-22

## 2023-11-20 RX ORDER — TRIAMCINOLONE 4 MG
40 TABLET ORAL ONCE
Refills: 0 | Status: DISCONTINUED | OUTPATIENT
Start: 2023-11-20 | End: 2023-11-20

## 2023-11-20 RX ORDER — MAGNESIUM HYDROXIDE 400 MG/1
30 TABLET, CHEWABLE ORAL
Refills: 0 | Status: DISCONTINUED | OUTPATIENT
Start: 2023-11-20 | End: 2023-11-22

## 2023-11-20 RX ORDER — LANOLIN
1 OINTMENT (GRAM) TOPICAL EVERY 6 HOURS
Refills: 0 | Status: DISCONTINUED | OUTPATIENT
Start: 2023-11-20 | End: 2023-11-22

## 2023-11-20 RX ORDER — SIMETHICONE 80 MG/1
80 TABLET, CHEWABLE ORAL EVERY 4 HOURS
Refills: 0 | Status: DISCONTINUED | OUTPATIENT
Start: 2023-11-20 | End: 2023-11-22

## 2023-11-20 RX ORDER — ONDANSETRON 8 MG/1
4 TABLET, FILM COATED ORAL EVERY 6 HOURS
Refills: 0 | Status: DISCONTINUED | OUTPATIENT
Start: 2023-11-20 | End: 2023-11-22

## 2023-11-20 RX ORDER — IBUPROFEN 200 MG
600 TABLET ORAL EVERY 6 HOURS
Refills: 0 | Status: COMPLETED | OUTPATIENT
Start: 2023-11-20 | End: 2024-10-18

## 2023-11-20 RX ORDER — OXYCODONE HYDROCHLORIDE 5 MG/1
5 TABLET ORAL ONCE
Refills: 0 | Status: DISCONTINUED | OUTPATIENT
Start: 2023-11-20 | End: 2023-11-22

## 2023-11-20 RX ORDER — CHLORHEXIDINE GLUCONATE 213 G/1000ML
1 SOLUTION TOPICAL DAILY
Refills: 0 | Status: DISCONTINUED | OUTPATIENT
Start: 2023-11-20 | End: 2023-11-20

## 2023-11-20 RX ORDER — DIPHENHYDRAMINE HCL 50 MG
25 CAPSULE ORAL EVERY 6 HOURS
Refills: 0 | Status: DISCONTINUED | OUTPATIENT
Start: 2023-11-20 | End: 2023-11-22

## 2023-11-20 RX ORDER — OXYCODONE HYDROCHLORIDE 5 MG/1
5 TABLET ORAL
Refills: 0 | Status: DISCONTINUED | OUTPATIENT
Start: 2023-11-20 | End: 2023-11-22

## 2023-11-20 RX ORDER — DEXAMETHASONE 0.5 MG/5ML
4 ELIXIR ORAL EVERY 6 HOURS
Refills: 0 | Status: DISCONTINUED | OUTPATIENT
Start: 2023-11-20 | End: 2023-11-22

## 2023-11-20 RX ORDER — MORPHINE SULFATE 50 MG/1
0.15 CAPSULE, EXTENDED RELEASE ORAL ONCE
Refills: 0 | Status: DISCONTINUED | OUTPATIENT
Start: 2023-11-20 | End: 2023-11-22

## 2023-11-20 RX ORDER — ACETAMINOPHEN 500 MG
975 TABLET ORAL
Refills: 0 | Status: DISCONTINUED | OUTPATIENT
Start: 2023-11-20 | End: 2023-11-22

## 2023-11-20 RX ORDER — GENTAMICIN SULFATE 40 MG/ML
500 VIAL (ML) INJECTION ONCE
Refills: 0 | Status: DISCONTINUED | OUTPATIENT
Start: 2023-11-20 | End: 2023-11-20

## 2023-11-20 RX ORDER — TETANUS TOXOID, REDUCED DIPHTHERIA TOXOID AND ACELLULAR PERTUSSIS VACCINE, ADSORBED 5; 2.5; 8; 8; 2.5 [IU]/.5ML; [IU]/.5ML; UG/.5ML; UG/.5ML; UG/.5ML
0.5 SUSPENSION INTRAMUSCULAR ONCE
Refills: 0 | Status: DISCONTINUED | OUTPATIENT
Start: 2023-11-20 | End: 2023-11-22

## 2023-11-20 RX ORDER — ALBUTEROL 90 UG/1
1.25 AEROSOL, METERED ORAL ONCE
Refills: 0 | Status: DISCONTINUED | OUTPATIENT
Start: 2023-11-20 | End: 2023-11-22

## 2023-11-20 RX ORDER — SODIUM CHLORIDE 9 MG/ML
1000 INJECTION, SOLUTION INTRAVENOUS
Refills: 0 | Status: DISCONTINUED | OUTPATIENT
Start: 2023-11-20 | End: 2023-11-22

## 2023-11-20 RX ORDER — SODIUM CHLORIDE 9 MG/ML
1000 INJECTION, SOLUTION INTRAVENOUS
Refills: 0 | Status: DISCONTINUED | OUTPATIENT
Start: 2023-11-20 | End: 2023-11-20

## 2023-11-20 RX ORDER — INFLUENZA VIRUS VACCINE 15; 15; 15; 15 UG/.5ML; UG/.5ML; UG/.5ML; UG/.5ML
0.5 SUSPENSION INTRAMUSCULAR ONCE
Refills: 0 | Status: COMPLETED | OUTPATIENT
Start: 2023-11-20 | End: 2023-11-20

## 2023-11-20 RX ORDER — IBUPROFEN 200 MG
600 TABLET ORAL EVERY 6 HOURS
Refills: 0 | Status: DISCONTINUED | OUTPATIENT
Start: 2023-11-20 | End: 2023-11-22

## 2023-11-20 RX ORDER — GENTAMICIN SULFATE 40 MG/ML
400 VIAL (ML) INJECTION ONCE
Refills: 0 | Status: DISCONTINUED | OUTPATIENT
Start: 2023-11-20 | End: 2023-11-20

## 2023-11-20 RX ADMIN — ENOXAPARIN SODIUM 40 MILLIGRAM(S): 100 INJECTION SUBCUTANEOUS at 21:48

## 2023-11-20 RX ADMIN — Medication 975 MILLIGRAM(S): at 16:39

## 2023-11-20 RX ADMIN — Medication 100 MILLIGRAM(S): at 08:38

## 2023-11-20 RX ADMIN — ONDANSETRON 4 MILLIGRAM(S): 8 TABLET, FILM COATED ORAL at 15:03

## 2023-11-20 RX ADMIN — SODIUM CHLORIDE 125 MILLILITER(S): 9 INJECTION, SOLUTION INTRAVENOUS at 21:48

## 2023-11-20 NOTE — OB PROVIDER DELIVERY SUMMARY - NSSELHIDDEN_OBGYN_ALL_OB_FT
[NS_DeliveryAttending1_OBGYN_ALL_OB_FT:[id number]=],[NS_DeliveryRN_OBGYN_ALL_OB_FT:[id number]=],[NS_DeliveryAssist1_OBGYN_ALL_OB_FT:[id number]=]

## 2023-11-20 NOTE — BRIEF OPERATIVE NOTE - NSICDXBRIEFPOSTOP_GEN_ALL_CORE_FT
POST-OP DIAGNOSIS:  Gestational hypertension 20-Nov-2023 10:46:13  Cristy Cohen  37 weeks gestation of pregnancy 20-Nov-2023 10:47:13  Cristy Cohen

## 2023-11-20 NOTE — OB RN PATIENT PROFILE - TEMPERATURE IN FAHRENHEIT (DEGREES F)
Covering CM following for discharge coordination.     Chart reviewed.  Patient goal currently to return home with spouse upon discharge.  Will need DME hospital bed, possible new home O2-requiring 4L high flow NC, RA baseline, and PT/OT. [facility] unable to provide DME as patient is straight Medicare in [place].  Possible PD cath placement next week.      Addendum 1155 - Writer sent hospital bed request to [facility] via [employer] system this date.  Awaiting confirmation they can provide.     CM follow up in Epic.  Case Management will continue to follow for DME, possible new home O2 and PT/OT arrangements closer to [place].     Brianda Loaiza RN  Covering  - [facility]   Ph: [phone]  Pgr: [phone] (M-F, [phone])   98

## 2023-11-20 NOTE — OB PROVIDER H&P - ASSESSMENT
38y  @ 37w3d weeks, AMA, gHTN, RH+/GBS-, for elective primary C/S.    Plan:  Admit to L&D  IVF  NPO  Continuous TOCO/EFM  Clinda/gent (due to cephalosporin allergy) prior to OR  Abdominal prep  SCDs B/L  Anesthesia consult    Dr. Vyas and Dr. Cohen PGY-1 aware

## 2023-11-20 NOTE — PROGRESS NOTE ADULT - ASSESSMENT
38y now P1 s/p primary elective C/S for gHTN, , Christianity, h/o asthma POD#0, recovering well.     - encourage ambulation, PO hydration  - f/u AM CBC   - monitor vitals, bleeding   - advance diet as tolerated  - encourage incentive spirometry   - pain mgmt prn   - simethicone  - routine postop care   - lovenox   - torrez till tomorrow AM    Discussed with Dr. Mata.

## 2023-11-20 NOTE — OB RN PATIENT PROFILE - SPIRITUAL CULTURAL, RELIGIOUS PRACTICES/VALUES, PROFILE
Chief Complaint   Patient presents with    Rib Injury     pain in the right ribs and around to the back after lifting a heavy table 5 days ago    Rash     She does have a rash under the right breast that radiates around to the back, she does not have a history of Shingles       HPI:  Patient presents today for  Complaints of pain that started to her right side of her back, pain now radiating around to the front of her ribs. She tells me that she now has a rash to that region as well. Her bra is really agrivating to the pain. Has not taken any medications for pain at this time. Prior to Visit Medications    Medication Sig Taking? Authorizing Provider   gabapentin (NEURONTIN) 100 MG capsule Take 1 capsule by mouth 3 times daily for 30 days. Intended supply: 90 days Yes GOLDIE Carter - CNP   valACYclovir (VALTREX) 1 g tablet Take 1 tablet by mouth 3 times daily for 7 days  Margot Hassan DO   dexamethasone (DECADRON) 4 MG tablet Take 1 tablet by mouth 2 times daily (with meals) for 10 days  Margot Hassan DO   fluticasone (FLONASE) 50 MCG/ACT nasal spray 1-2 sprays, each nostril daily as needed for nasal congestion. Cindi Weaver., APRN - CNP   cetirizine (ZYRTEC ALLERGY) 10 MG tablet Take 1 tablet by mouth daily  Perrira Andrews, APRN - CNP   LORazepam (ATIVAN) 1 MG tablet Take 1 tablet by mouth every 6 hours as needed for Anxiety.   Margot Hassan DO   ibuprofen (IBU) 800 MG tablet Take 1 tablet by mouth every 6 hours as needed for Pain  Patient not taking: Reported on 11/11/2021  Stefanie Holder MD   ondansetron (ZOFRAN-ODT) 4 MG disintegrating tablet Take 1 tablet by mouth every 8 hours as needed for Nausea or Vomiting  Patient not taking: Reported on 11/11/2021  Stefanie Holder MD   mupirocin Aria Jimenez) 2 % ointment Apply to nostrils at bedtime  Margot Hassan DO   diclofenac sodium (VOLTAREN) 1 % GEL Apply 2 g topically 4 times daily  Patient not taking: Reported on Dispense: 90 capsule; Refill: 0    Return if symptoms worsen or fail to improve.       Nikky Mathew, APRN - CNP none

## 2023-11-20 NOTE — OB RN INTRAOPERATIVE NOTE - NSSELHIDDEN_OBGYN_ALL_OB_FT
[NS_DeliveryAttending1_OBGYN_ALL_OB_FT:[id number]=],[NS_DeliveryRN_OBGYN_ALL_OB_FT:[id number]=],[NS_DeliveryAssist1_OBGYN_ALL_OB_FT:[id number]=]

## 2023-11-20 NOTE — BLOOD PRODUCT ACCEPTANCE FORM - NSBACATEGORY1_BLD_A_OB NEG
No Red Blood Cells/No Fresh Frozen Plasma/No Platelets/No Autologous Banked Blood/No Cryoprecipitate

## 2023-11-20 NOTE — OB PROVIDER H&P - ATTENDING COMMENTS
agree with above  37 yo  @37w3d presents for scheduled  section  elective  for gHTN  no surgical history   patient is Mormon    reactive NST  no ctx  vtx by parveen    discussed r/b/a including bleeding, infection, injury to surrounding structures, risk VTE/anesthesia. Expressed understanding and written informed consent was obtained  Bloodless consent form signed  admission labs  anesthesia consult  on call to OR

## 2023-11-20 NOTE — CHART NOTE - NSCHARTNOTEFT_GEN_A_CORE
PACU ANESTHESIA ADMISSION NOTE  ____  Intubated  TV:______       Rate: ______      FiO2: ______  _x___  Patent Airway  ____  Full return of protective reflexes  ____  Full recovery from anesthesia / back to baseline   Mental Status:    x____ Awake    ____ Alert     ____ Drowsy    ____ Sedated  Nausea/Vomiting:   _x___ NO    ____ Yes,   See Post - Op Orders        Pain Scale (0-10):    ____ Treatment:   __x__ None      ____ See Post - Op/PCA Orders  Post - Operative Fluids:    _x___ Oral     ____ See Post - Op Orders  Plan: Discharge:     ____Home         _x____Floor       _____Critical Care      _____  Other:_________________    Comments:

## 2023-11-20 NOTE — OB PROVIDER H&P - NSLOWPPHRISK_OBGYN_A_OB
No previous uterine incision/Shetty Pregnancy/Less than or equal to 4 previous vaginal births/No known bleeding disorder/No history of postpartum hemorrhage

## 2023-11-20 NOTE — OB PROVIDER H&P - NSHPPHYSICALEXAM_GEN_ALL_CORE
T(C): 36.7 (11-20-23 @ 07:00), Max: 36.7 (11-20-23 @ 07:00)  HR: 83 (11-20-23 @ 07:47) (73 - 101)  BP: 131/96 (11-20-23 @ 07:47) (131/96 - 148/100)  RR: 18 (11-20-23 @ 07:00) (18 - 18)    General: alert, oriented, no acute distress  Abd: soft, gravid, nontender  Extremities: no sign of DVT on exam    EFM: 135 bpm, moderate variability, +accels (Cat 1)  TOCO:  no ctx

## 2023-11-20 NOTE — OB PROVIDER H&P - NSHPLABSRESULTS_GEN_ALL_CORE
LABS    11/2/23  GBS-negative  CBC 9.91>13.5/39.5<213    10/5/23  HIV nonreactive  CBC 9.78>12.4/38.5<211  HBsAg nonreactive  HCVab nonreactive    8/14/23      8/3/23  SMA negative    7/19/23  msAFP screen negative    6/1/23  Blood type O positive  antibody screen negative  HBsAg nonreactive  HCVab nonreactive  Hb electrophoresis nl AAF  Syphilis screen negative  Rubella immune  Measles immune  Varicella immune  urine culture normal penny  NIPS low risk  SMA increased risk (FOB negative)  CF, Frag X negative    4/24/23 Pap NILM/HPV-    ULTRASOUNDS  11/14/23 @ 35w3d: SIUP, cephalic, placenta fundal, BPP 8/8  10/18/23 @ 31w4d: EFW 2148g  6/1/23 @ 12w6d: OMID 12/8/23

## 2023-11-20 NOTE — OB RN DELIVERY SUMMARY - NSSELHIDDEN_OBGYN_ALL_OB_FT
[NS_DeliveryAttending1_OBGYN_ALL_OB_FT:[id number]],[NS_DeliveryRN_OBGYN_ALL_OB_FT:[id number]],[NS_DeliveryAssist1_OBGYN_ALL_OB_FT:[id number]=]

## 2023-11-20 NOTE — OB PROVIDER H&P - HISTORY OF PRESENT ILLNESS
38y  @ 37w3d by first trimester US, EDC 23, presents for scheduled elective primary C/S.     Antepartum complications:  -gestational hypertension, reports home BPs 140-50s/90s; denies preeclamptic symptoms  -Congregation; blood products management form reviewed and signed with pt  -increased risk for SMA carrier, FOB tested negative  -mild intermittent asthma; denies hopsitalizations/intubations; last albuterol use ~2 months ago  -goiter/thyroid nodules; no meds; pt follows w/ biopsies/imaging  -cephalosporin allergy

## 2023-11-20 NOTE — OB PROVIDER H&P - NSHPREVIEWOFSYSTEMS_GEN_ALL_CORE
Constitutional: DENIES fever, chills, malaise  Respiratory: DENIES cough, SOB  GI: DENIES nausea, vomiting, diarrhea  /GYN: DENIES vaginal bleeding, leakage of fluid, dysuria, contractions

## 2023-11-20 NOTE — BRIEF OPERATIVE NOTE - NSICDXBRIEFPREOP_GEN_ALL_CORE_FT
PRE-OP DIAGNOSIS:  Gestational hypertension 20-Nov-2023 10:45:36  Cristy Cohen  37 weeks gestation of pregnancy 20-Nov-2023 10:46:04  Cristy Cohen

## 2023-11-20 NOTE — BRIEF OPERATIVE NOTE - OPERATION/FINDINGS
Preop TXA given. Pfannenstiel incision. LTCS. Normal appearing uterus, bilateral tubes and ovaries. Viable male infant delivered, weighing 2860g, Apgars 9/9. Hysterotomy closed in two layers. Rectus reapproximated. Kenalog 40mg administered to incision.

## 2023-11-21 LAB
BASOPHILS # BLD AUTO: 0.02 K/UL — SIGNIFICANT CHANGE UP (ref 0–0.2)
BASOPHILS # BLD AUTO: 0.02 K/UL — SIGNIFICANT CHANGE UP (ref 0–0.2)
BASOPHILS NFR BLD AUTO: 0.1 % — SIGNIFICANT CHANGE UP (ref 0–1)
BASOPHILS NFR BLD AUTO: 0.1 % — SIGNIFICANT CHANGE UP (ref 0–1)
EOSINOPHIL # BLD AUTO: 0 K/UL — SIGNIFICANT CHANGE UP (ref 0–0.7)
EOSINOPHIL # BLD AUTO: 0 K/UL — SIGNIFICANT CHANGE UP (ref 0–0.7)
EOSINOPHIL NFR BLD AUTO: 0 % — SIGNIFICANT CHANGE UP (ref 0–8)
EOSINOPHIL NFR BLD AUTO: 0 % — SIGNIFICANT CHANGE UP (ref 0–8)
HCT VFR BLD CALC: 34.4 % — LOW (ref 37–47)
HCT VFR BLD CALC: 34.4 % — LOW (ref 37–47)
HGB BLD-MCNC: 11.6 G/DL — LOW (ref 12–16)
HGB BLD-MCNC: 11.6 G/DL — LOW (ref 12–16)
IMM GRANULOCYTES NFR BLD AUTO: 0.5 % — HIGH (ref 0.1–0.3)
IMM GRANULOCYTES NFR BLD AUTO: 0.5 % — HIGH (ref 0.1–0.3)
LYMPHOCYTES # BLD AUTO: 1.81 K/UL — SIGNIFICANT CHANGE UP (ref 1.2–3.4)
LYMPHOCYTES # BLD AUTO: 1.81 K/UL — SIGNIFICANT CHANGE UP (ref 1.2–3.4)
LYMPHOCYTES # BLD AUTO: 9.9 % — LOW (ref 20.5–51.1)
LYMPHOCYTES # BLD AUTO: 9.9 % — LOW (ref 20.5–51.1)
MCHC RBC-ENTMCNC: 32.4 PG — HIGH (ref 27–31)
MCHC RBC-ENTMCNC: 32.4 PG — HIGH (ref 27–31)
MCHC RBC-ENTMCNC: 33.7 G/DL — SIGNIFICANT CHANGE UP (ref 32–37)
MCHC RBC-ENTMCNC: 33.7 G/DL — SIGNIFICANT CHANGE UP (ref 32–37)
MCV RBC AUTO: 96.1 FL — SIGNIFICANT CHANGE UP (ref 81–99)
MCV RBC AUTO: 96.1 FL — SIGNIFICANT CHANGE UP (ref 81–99)
MONOCYTES # BLD AUTO: 1 K/UL — HIGH (ref 0.1–0.6)
MONOCYTES # BLD AUTO: 1 K/UL — HIGH (ref 0.1–0.6)
MONOCYTES NFR BLD AUTO: 5.5 % — SIGNIFICANT CHANGE UP (ref 1.7–9.3)
MONOCYTES NFR BLD AUTO: 5.5 % — SIGNIFICANT CHANGE UP (ref 1.7–9.3)
NEUTROPHILS # BLD AUTO: 15.34 K/UL — HIGH (ref 1.4–6.5)
NEUTROPHILS # BLD AUTO: 15.34 K/UL — HIGH (ref 1.4–6.5)
NEUTROPHILS NFR BLD AUTO: 84 % — HIGH (ref 42.2–75.2)
NEUTROPHILS NFR BLD AUTO: 84 % — HIGH (ref 42.2–75.2)
NRBC # BLD: 0 /100 WBCS — SIGNIFICANT CHANGE UP (ref 0–0)
NRBC # BLD: 0 /100 WBCS — SIGNIFICANT CHANGE UP (ref 0–0)
PLATELET # BLD AUTO: 160 K/UL — SIGNIFICANT CHANGE UP (ref 130–400)
PLATELET # BLD AUTO: 160 K/UL — SIGNIFICANT CHANGE UP (ref 130–400)
PMV BLD: 12.4 FL — HIGH (ref 7.4–10.4)
PMV BLD: 12.4 FL — HIGH (ref 7.4–10.4)
RBC # BLD: 3.58 M/UL — LOW (ref 4.2–5.4)
RBC # BLD: 3.58 M/UL — LOW (ref 4.2–5.4)
RBC # FLD: 12.9 % — SIGNIFICANT CHANGE UP (ref 11.5–14.5)
RBC # FLD: 12.9 % — SIGNIFICANT CHANGE UP (ref 11.5–14.5)
WBC # BLD: 18.27 K/UL — HIGH (ref 4.8–10.8)
WBC # BLD: 18.27 K/UL — HIGH (ref 4.8–10.8)
WBC # FLD AUTO: 18.27 K/UL — HIGH (ref 4.8–10.8)
WBC # FLD AUTO: 18.27 K/UL — HIGH (ref 4.8–10.8)

## 2023-11-21 RX ORDER — BUDESONIDE AND FORMOTEROL FUMARATE DIHYDRATE 160; 4.5 UG/1; UG/1
2 AEROSOL RESPIRATORY (INHALATION)
Refills: 0 | Status: DISCONTINUED | OUTPATIENT
Start: 2023-11-21 | End: 2023-11-22

## 2023-11-21 RX ADMIN — Medication 600 MILLIGRAM(S): at 18:09

## 2023-11-21 RX ADMIN — Medication 600 MILLIGRAM(S): at 13:04

## 2023-11-21 RX ADMIN — Medication 600 MILLIGRAM(S): at 13:37

## 2023-11-21 RX ADMIN — Medication 975 MILLIGRAM(S): at 21:31

## 2023-11-21 RX ADMIN — ENOXAPARIN SODIUM 40 MILLIGRAM(S): 100 INJECTION SUBCUTANEOUS at 21:31

## 2023-11-21 RX ADMIN — Medication 975 MILLIGRAM(S): at 16:28

## 2023-11-21 RX ADMIN — Medication 975 MILLIGRAM(S): at 22:00

## 2023-11-21 RX ADMIN — Medication 600 MILLIGRAM(S): at 06:27

## 2023-11-21 RX ADMIN — Medication 600 MILLIGRAM(S): at 17:24

## 2023-11-21 RX ADMIN — Medication 975 MILLIGRAM(S): at 09:39

## 2023-11-21 RX ADMIN — Medication 975 MILLIGRAM(S): at 15:54

## 2023-11-21 RX ADMIN — Medication 1 TABLET(S): at 13:12

## 2023-11-21 RX ADMIN — Medication 975 MILLIGRAM(S): at 08:39

## 2023-11-21 NOTE — PROGRESS NOTE ADULT - ASSESSMENT
38y now P1 s/p elective primary C/S for gHTN POD#1, recovering well.     - encourage ambulation, PO hydration  - f/u AM CBC   - monitor vitals, bleeding   - regular diet  - encourage incentive spirometry   - pain mgmt prn   - simethicone  - routine postop care   - lovenox     Discussed with Dr. Sauer.  38y now P1 s/p elective primary C/S for gHTN POD#1, recovering well.     - encourage ambulation, PO hydration  - f/u AM CBC   - monitor vitals, bleeding   - regular diet  - encourage incentive spirometry   - pain mgmt prn   - simethicone  - routine postop care   - lovenox

## 2023-11-22 ENCOUNTER — TRANSCRIPTION ENCOUNTER (OUTPATIENT)
Age: 38
End: 2023-11-22

## 2023-11-22 VITALS
TEMPERATURE: 99 F | SYSTOLIC BLOOD PRESSURE: 145 MMHG | HEART RATE: 79 BPM | RESPIRATION RATE: 18 BRPM | DIASTOLIC BLOOD PRESSURE: 85 MMHG

## 2023-11-22 RX ORDER — DOCUSATE SODIUM 100 MG
1 CAPSULE ORAL
Qty: 60 | Refills: 0
Start: 2023-11-22 | End: 2023-12-21

## 2023-11-22 RX ORDER — SIMETHICONE 80 MG/1
1 TABLET, CHEWABLE ORAL
Qty: 30 | Refills: 0
Start: 2023-11-22 | End: 2023-11-26

## 2023-11-22 RX ORDER — IBUPROFEN 200 MG
1 TABLET ORAL
Qty: 48 | Refills: 0
Start: 2023-11-22 | End: 2023-12-03

## 2023-11-22 RX ORDER — ACETAMINOPHEN 500 MG
3 TABLET ORAL
Qty: 144 | Refills: 0
Start: 2023-11-22 | End: 2023-12-03

## 2023-11-22 RX ADMIN — Medication 975 MILLIGRAM(S): at 08:37

## 2023-11-22 RX ADMIN — Medication 1 TABLET(S): at 11:39

## 2023-11-22 RX ADMIN — Medication 975 MILLIGRAM(S): at 04:37

## 2023-11-22 RX ADMIN — Medication 975 MILLIGRAM(S): at 09:47

## 2023-11-22 RX ADMIN — Medication 600 MILLIGRAM(S): at 06:15

## 2023-11-22 RX ADMIN — Medication 600 MILLIGRAM(S): at 01:25

## 2023-11-22 RX ADMIN — Medication 600 MILLIGRAM(S): at 11:49

## 2023-11-22 RX ADMIN — Medication 600 MILLIGRAM(S): at 00:04

## 2023-11-22 RX ADMIN — Medication 975 MILLIGRAM(S): at 03:46

## 2023-11-22 RX ADMIN — Medication 600 MILLIGRAM(S): at 11:39

## 2023-11-22 NOTE — DISCHARGE NOTE OB - CARE PROVIDER_API CALL
Larissa Vyas  Obstetrics and Gynecology  [address] [place] [id number]  Phone: [phone]  Fax: [phone]  Follow Up Time:

## 2023-11-22 NOTE — DISCHARGE NOTE OB - HOSPITAL COURSE
Patient underwent an uncomplicated elective LTCS forgHTN. , H/H as below. Patient’s postoperative course was unremarkable and she remained hemodynamically stable and afebrile throughout. Upon discharge on POD2, the patient is ambulating and voiding spontaneously, tolerating oral intake, pain was well controlled with oral medication, and vital signs were stable.

## 2023-11-22 NOTE — PROGRESS NOTE ADULT - ATTENDING COMMENTS
agree with above  39 yo P1, s/p primary elective , gHTN, POD2, doing well  denies HA, blurry vision, fever, chills, CP, SOB, N/V, LE pain  She is ambulating, voiding, passing flatus, no BM, tolerating regular diet, breast/bottle feeding  Minimal abdominal pain and vaginal bleeding    abdomen soft, nontender, firm uterine fundus below umbilicus, no r/r/g  normal lochia  well healing pfannenstiel skin incision, steris in place, c/d/i    routine postpartum care  pain management PRN  regular diet, PO hydration  encourage ambulation  lovenox DVT ppx  monitor vitals  discharge today, f/up 2wks
39 yo P1 s/p elective primary  deliver for gHTN POD#1, recovering well.   Patient well appearing, pain well controlled. She has been ambulating, voiding, passing flatus, and tolerating regular diet without difficulty.  Abdomen soft, nontender, mildly distended. Clean, dry, intact steri strips over pfannenstiel skin incision.  Normal lochia.  Mild bilateral symmetric pedal edema.  - Encouraged ambulation and PO hydration  - Encouraged incentive spirometry  - Diet as tolerated  - Lovenox for prophylaxis  - Pain management per protocol

## 2023-11-22 NOTE — PROGRESS NOTE ADULT - ASSESSMENT
38y now P1 s/p elective primary C/S for gHTN POD#2, recovering well.     - encourage ambulation, PO hydration  - monitor vitals, bleeding   - regular diet  - encourage incentive spirometry   - pain mgmt prn   - simethicone, miralax   - routine postop care   - lovenox   - anticipate d/c home later today or tomorrow    Discussed with Dr. Sauer.

## 2023-11-22 NOTE — DISCHARGE NOTE OB - PATIENT PORTAL LINK FT
You can access the FollowMyHealth Patient Portal offered by [facility] by registering at the following website: [email or web address]. By joining [employer]’s FollowMyHealth portal, you will also be able to view your health information using other applications (apps) compatible with our system.

## 2023-11-22 NOTE — DISCHARGE NOTE OB - MEDICATION SUMMARY - MEDICATIONS TO TAKE
I will START or STAY ON the medications listed below when I get home from the hospital:    ibuprofen 600 mg oral tablet  -- 1 tab(s) by mouth every 6 hours as needed for pain  -- Indication: For pain    acetaminophen 325 mg oral tablet  -- 3 tab(s) by mouth every 6 hours as needed for pain  -- Indication: For pain    simethicone 80 mg oral tablet, chewable  -- 1 tab(s) by mouth every 4 hours as needed for Gas  -- Indication: For gas

## 2023-11-22 NOTE — DISCHARGE NOTE OB - ADDITIONAL INSTRUCTIONS
Continue to monitor your BPs at home. If your BP is more than 160 on top or 110 on the bottom, please call you doctor and come to the hospital. If you have a headache that doesn't go away, vision changes, chest pain, shortness of breath, or sudden worsening of your leg swelling, please call your doctor and come to labor and delivery.

## 2023-11-22 NOTE — PROGRESS NOTE ADULT - SUBJECTIVE AND OBJECTIVE BOX
PGY1 Note:  Patient seen and examined at bedside, recovering well, no acute complaints. Denies fever, chills, chest pain, SOB, nausea, vomiting, LE pain. Pain well-controlled with medication. Minimal bleeding, torrez in place, TOV 1300. OOB to chair. Tolerating regular diet. Attempting to breastfeed. Denies HA, SOB, chest pain, dizziness, vision changes.     Physical Exam  Vital Signs Last 24 Hrs  T(C): 36.9 (21 Nov 2023 03:39), Max: 36.9 (21 Nov 2023 00:06)  T(F): 98.5 (21 Nov 2023 03:39), Max: 98.5 (21 Nov 2023 03:39)  HR: 67 (21 Nov 2023 03:39) (66 - 101)  BP: 131/73 (21 Nov 2023 03:39) (59/35 - 148/100)  RR: 18 (21 Nov 2023 03:39) (18 - 20)  SpO2: 97% (20 Nov 2023 12:27) (90% - 97%)    Parameters below as of 20 Nov 2023 07:00  Patient On (Oxygen Delivery Method): room air    Gen: AAOx3, NAD  Heart: Normal S1S2, RRR, no m/r/g  Lungs: CTA b/l, no r/r/w   Fundus: firm, below umbilicus   Wound: dressing changed, steris in place c/d/i   Abd: Soft, nontender, nondistended, BS+  Lochia: minimal  Ext: No calf tenderness, no swelling, SCDs in place    Labs:                        13.3   10.25 )-----------( 177      ( 20 Nov 2023 07:50 )             39.0        acetaminophen     Tablet .. 975 milliGRAM(s) Oral <User Schedule>  albuterol   0.042% 1.25 milliGRAM(s) Nebulizer once PRN  budesonide  80 MICROgram(s)/formoterol 4.5 MICROgram(s) Inhaler 2 Puff(s) Inhalation two times a day  dexAMETHasone  Injectable 4 milliGRAM(s) IV Push every 6 hours PRN  diphenhydrAMINE 25 milliGRAM(s) Oral every 6 hours PRN  diphtheria/tetanus/pertussis (acellular) Vaccine (Adacel) 0.5 milliLiter(s) IntraMuscular once  enoxaparin Injectable 40 milliGRAM(s) SubCutaneous every 24 hours  ibuprofen  Tablet. 600 milliGRAM(s) Oral every 6 hours  lactated ringers. 1000 milliLiter(s) IV Continuous <Continuous>  lanolin Ointment 1 Application(s) Topical every 6 hours PRN  magnesium hydroxide Suspension 30 milliLiter(s) Oral two times a day PRN  morphine PF Spinal 0.15 milliGRAM(s) IntraThecal. once  naloxone Injectable 0.1 milliGRAM(s) IV Push every 3 minutes PRN  ondansetron Injectable 4 milliGRAM(s) IV Push every 6 hours PRN  oxyCODONE    IR 5 milliGRAM(s) Oral every 3 hours PRN  oxyCODONE    IR 5 milliGRAM(s) Oral once PRN  oxytocin Infusion 333.333 milliUNIT(s)/Min IV Continuous <Continuous>  oxytocin Infusion 333.333 milliUNIT(s)/Min IV Continuous <Continuous>  prenatal multivitamin 1 Tablet(s) Oral daily  simethicone 80 milliGRAM(s) Chew every 4 hours PRN
 PGY1 Note:  Patient seen and examined at bedside, recovering well, no acute complaints. Denies fever, chills, chest pain, SOB, nausea, vomiting, LE pain. Pain well-controlled with medication. Minimal bleeding, torrez in place. Not yet ambulated or passed flatus. Has not yet tried tolerating PO. Attempting to breastfeed, lactation consultant in room. No HA, vision changes.     Physical Exam  Vital Signs Last 24 Hrs  T(C): 36.7 (20 Nov 2023 13:53), Max: 36.7 (20 Nov 2023 07:00)  T(F): 98 (20 Nov 2023 13:53), Max: 98 (20 Nov 2023 07:00)  HR: 71 (20 Nov 2023 13:53) (70 - 101)  BP: 143/80 (20 Nov 2023 13:53) (59/35 - 148/100)  RR: 18 (20 Nov 2023 13:53) (18 - 18)  SpO2: 97% (20 Nov 2023 12:27) (90% - 97%)    Parameters below as of 20 Nov 2023 07:00  Patient On (Oxygen Delivery Method): room air    Gen: AAOx3, NAD  Heart: Normal S1S2, RRR, no m/r/g  Lungs: CTA b/l, no r/r/w   Fundus: firm, below umbilicus   Wound: dressing in place c/d/i   Abd: Soft, nontender, nondistended, BS+  Lochia: minimal  Ext: No calf tenderness, no swelling, SCDs in place    Labs:                        13.3   10.25 )-----------( 177      ( 20 Nov 2023 07:50 )             39.0        acetaminophen     Tablet .. 975 milliGRAM(s) Oral <User Schedule>  albuterol   0.042% 1.25 milliGRAM(s) Nebulizer once PRN  budesonide  80 MICROgram(s)/formoterol 4.5 MICROgram(s) Inhaler 2 Puff(s) Inhalation two times a day  diphenhydrAMINE 25 milliGRAM(s) Oral every 6 hours PRN  diphtheria/tetanus/pertussis (acellular) Vaccine (Adacel) 0.5 milliLiter(s) IntraMuscular once  enoxaparin Injectable 40 milliGRAM(s) SubCutaneous every 24 hours  gentamicin   IVPB 400 milliGRAM(s) IV Intermittent once  ibuprofen  Tablet. 600 milliGRAM(s) Oral every 6 hours  influenza   Vaccine 0.5 milliLiter(s) IntraMuscular once  lactated ringers. 1000 milliLiter(s) IV Continuous <Continuous>  lanolin Ointment 1 Application(s) Topical every 6 hours PRN  magnesium hydroxide Suspension 30 milliLiter(s) Oral two times a day PRN  oxyCODONE    IR 5 milliGRAM(s) Oral every 3 hours PRN  oxyCODONE    IR 5 milliGRAM(s) Oral once PRN  oxytocin Infusion 333.333 milliUNIT(s)/Min IV Continuous <Continuous>  oxytocin Infusion 333.333 milliUNIT(s)/Min IV Continuous <Continuous>  prenatal multivitamin 1 Tablet(s) Oral daily  simethicone 80 milliGRAM(s) Chew every 4 hours PRN  triamcinolone    acetonide 40 mG/mL Injectable (KENALOG-40) 40 milliGRAM(s) IntraDermal once
 PGY1 Note:  Patient seen and examined at bedside, recovering well, no acute complaints. Denies fever, chills, chest pain, SOB, nausea, vomiting, LE pain. Pain well-controlled with medication. Minimal bleeding, voiding, ambulating, passed flatus, tolerating regular diet. Breast and bottle feeding.     Physical Exam  Vital Signs Last 24 Hrs  T(C): 37 (21 Nov 2023 23:50), Max: 37.1 (21 Nov 2023 15:27)  T(F): 98.6 (21 Nov 2023 23:50), Max: 98.8 (21 Nov 2023 15:27)  HR: 73 (21 Nov 2023 23:50) (64 - 85)  BP: 130/66 (21 Nov 2023 23:50) (114/59 - 134/73)  RR: 20 (21 Nov 2023 23:50) (18 - 20)    Gen: AAOx3, NAD  Heart: Normal S1S2, RRR, no m/r/g  Lungs: CTA b/l, no r/r/w   Fundus: firm, below umbilicus   Wound: steris in place c/d/i   Abd: Soft, nontender, nondistended, BS+  Lochia: minimal  Ext: No calf tenderness, no swelling    Labs:                        11.6   18.27 )-----------( 160      ( 21 Nov 2023 07:00 )             34.4                         13.3   10.25 )-----------( 177      ( 20 Nov 2023 07:50 )             39.0        acetaminophen     Tablet .. 975 milliGRAM(s) Oral <User Schedule>  albuterol   0.042% 1.25 milliGRAM(s) Nebulizer once PRN  budesonide  80 MICROgram(s)/formoterol 4.5 MICROgram(s) Inhaler 2 Puff(s) Inhalation two times a day PRN  dexAMETHasone  Injectable 4 milliGRAM(s) IV Push every 6 hours PRN  diphenhydrAMINE 25 milliGRAM(s) Oral every 6 hours PRN  diphtheria/tetanus/pertussis (acellular) Vaccine (Adacel) 0.5 milliLiter(s) IntraMuscular once  enoxaparin Injectable 40 milliGRAM(s) SubCutaneous every 24 hours  ibuprofen  Tablet. 600 milliGRAM(s) Oral every 6 hours  lactated ringers. 1000 milliLiter(s) IV Continuous <Continuous>  lanolin Ointment 1 Application(s) Topical every 6 hours PRN  magnesium hydroxide Suspension 30 milliLiter(s) Oral two times a day PRN  morphine PF Spinal 0.15 milliGRAM(s) IntraThecal. once  naloxone Injectable 0.1 milliGRAM(s) IV Push every 3 minutes PRN  ondansetron Injectable 4 milliGRAM(s) IV Push every 6 hours PRN  oxyCODONE    IR 5 milliGRAM(s) Oral every 3 hours PRN  oxyCODONE    IR 5 milliGRAM(s) Oral once PRN  oxytocin Infusion 333.333 milliUNIT(s)/Min IV Continuous <Continuous>  oxytocin Infusion 333.333 milliUNIT(s)/Min IV Continuous <Continuous>  prenatal multivitamin 1 Tablet(s) Oral daily  simethicone 80 milliGRAM(s) Chew every 4 hours PRN

## 2023-11-22 NOTE — DISCHARGE NOTE OB - CARE PLAN
Principal Discharge DX:	Single delivery by  section  Assessment and plan of treatment:	s/p elective  section   1

## 2023-11-24 DIAGNOSIS — O13.9 GESTATIONAL [PREGNANCY-INDUCED] HYPERTENSION W/OUT SIGNIFICANT PROTEINURIA, UNSPECIFIED TRIMESTER: ICD-10-CM

## 2023-11-24 RX ORDER — NIFEDIPINE 30 MG/1
30 TABLET, FILM COATED, EXTENDED RELEASE ORAL DAILY
Qty: 90 | Refills: 1 | Status: ACTIVE | COMMUNITY
Start: 2023-11-24 | End: 1900-01-01

## 2023-11-27 PROBLEM — Z86.39 PERSONAL HISTORY OF OTHER ENDOCRINE, NUTRITIONAL AND METABOLIC DISEASE: Chronic | Status: ACTIVE | Noted: 2023-11-20

## 2023-11-28 ENCOUNTER — APPOINTMENT (OUTPATIENT)
Dept: OBGYN | Facility: CLINIC | Age: 38
End: 2023-11-28
Payer: COMMERCIAL

## 2023-11-28 ENCOUNTER — NON-APPOINTMENT (OUTPATIENT)
Age: 38
End: 2023-11-28

## 2023-11-28 VITALS — DIASTOLIC BLOOD PRESSURE: 90 MMHG | SYSTOLIC BLOOD PRESSURE: 142 MMHG

## 2023-11-28 PROCEDURE — 0503F POSTPARTUM CARE VISIT: CPT

## 2023-12-05 ENCOUNTER — APPOINTMENT (OUTPATIENT)
Dept: OBGYN | Facility: CLINIC | Age: 38
End: 2023-12-05
Payer: COMMERCIAL

## 2023-12-05 VITALS
BODY MASS INDEX: 35.97 KG/M2 | HEIGHT: 63 IN | SYSTOLIC BLOOD PRESSURE: 132 MMHG | DIASTOLIC BLOOD PRESSURE: 84 MMHG | WEIGHT: 203 LBS

## 2023-12-05 DIAGNOSIS — O09.513 SUPERVISION OF ELDERLY PRIMIGRAVIDA, THIRD TRIMESTER: ICD-10-CM

## 2023-12-05 DIAGNOSIS — Z3A.37 37 WEEKS GESTATION OF PREGNANCY: ICD-10-CM

## 2023-12-05 PROCEDURE — 0503F POSTPARTUM CARE VISIT: CPT

## 2024-01-09 ENCOUNTER — APPOINTMENT (OUTPATIENT)
Dept: OBGYN | Facility: CLINIC | Age: 39
End: 2024-01-09
Payer: COMMERCIAL

## 2024-01-09 VITALS
HEIGHT: 63 IN | WEIGHT: 206 LBS | SYSTOLIC BLOOD PRESSURE: 124 MMHG | DIASTOLIC BLOOD PRESSURE: 78 MMHG | BODY MASS INDEX: 36.5 KG/M2

## 2024-01-09 PROCEDURE — 0503F POSTPARTUM CARE VISIT: CPT

## 2024-10-01 ENCOUNTER — APPOINTMENT (OUTPATIENT)
Dept: OBGYN | Facility: CLINIC | Age: 39
End: 2024-10-01
Payer: COMMERCIAL

## 2024-10-01 VITALS
SYSTOLIC BLOOD PRESSURE: 132 MMHG | HEART RATE: 113 BPM | DIASTOLIC BLOOD PRESSURE: 92 MMHG | BODY MASS INDEX: 36.5 KG/M2 | WEIGHT: 206 LBS | HEIGHT: 63 IN

## 2024-10-01 DIAGNOSIS — Z01.419 ENCOUNTER FOR GYNECOLOGICAL EXAMINATION (GENERAL) (ROUTINE) W/OUT ABNORMAL FINDINGS: ICD-10-CM

## 2024-10-01 LAB
APPEARANCE: CLEAR
BILIRUBIN URINE: NEGATIVE
BLOOD URINE: NEGATIVE
COLOR: YELLOW
GLUCOSE QUALITATIVE U: NEGATIVE
KETONES URINE: NEGATIVE
LEUKOCYTE ESTERASE URINE: NEGATIVE
NITRITE URINE: NEGATIVE
PH URINE: 5.5
PROTEIN URINE: NEGATIVE
SPECIFIC GRAVITY URINE: >=1.03
UROBILINOGEN URINE: 0.2 (ref 0.2–?)

## 2024-10-01 PROCEDURE — 99395 PREV VISIT EST AGE 18-39: CPT

## 2024-10-01 NOTE — HISTORY OF PRESENT ILLNESS
[FreeTextEntry1] : 40 yo P1 w/ LMP 9/13/24 here for annual exam. Menses are regular, not heavy or painful. Sexually active with , no issues. Uses condoms consistently. No abdominal pain. Have some urinary urgency, no incontinence.  Last pap 2023 NILM, HPV neg

## 2024-10-03 LAB — HPV HIGH+LOW RISK DNA PNL CVX: NOT DETECTED
